# Patient Record
Sex: MALE | Race: WHITE | Employment: OTHER | URBAN - METROPOLITAN AREA
[De-identification: names, ages, dates, MRNs, and addresses within clinical notes are randomized per-mention and may not be internally consistent; named-entity substitution may affect disease eponyms.]

---

## 2017-09-14 ENCOUNTER — HOSPITAL ENCOUNTER (OUTPATIENT)
Dept: LAB | Age: 62
Discharge: HOME | End: 2017-09-14

## 2017-09-14 PROCEDURE — 88312 SPECIAL STAINS GROUP 1: CPT | Performed by: INTERNAL MEDICINE

## 2017-09-14 PROCEDURE — 88305 TISSUE EXAM BY PATHOLOGIST: CPT | Performed by: INTERNAL MEDICINE

## 2017-10-24 ENCOUNTER — HOSPITAL ENCOUNTER (OUTPATIENT)
Dept: MAMMOGRAPHY | Age: 62
Discharge: HOME | End: 2017-10-24
Attending: FAMILY MEDICINE
Payer: MEDICARE

## 2017-10-24 DIAGNOSIS — M81.0 OSTEOPOROSIS: ICD-10-CM

## 2017-10-24 PROCEDURE — 77080 DXA BONE DENSITY AXIAL: CPT

## 2018-02-16 ENCOUNTER — HOSPITAL ENCOUNTER (EMERGENCY)
Age: 63
Discharge: HOME | End: 2018-02-16
Attending: EMERGENCY MEDICINE
Payer: MEDICARE

## 2018-02-16 ENCOUNTER — APPOINTMENT (OUTPATIENT)
Dept: GENERAL RADIOLOGY | Age: 63
End: 2018-02-16
Attending: EMERGENCY MEDICINE
Payer: MEDICARE

## 2018-02-16 VITALS
HEART RATE: 95 BPM | SYSTOLIC BLOOD PRESSURE: 144 MMHG | TEMPERATURE: 98.1 F | DIASTOLIC BLOOD PRESSURE: 70 MMHG | OXYGEN SATURATION: 97 % | RESPIRATION RATE: 20 BRPM

## 2018-02-16 DIAGNOSIS — S62.102A LEFT WRIST FRACTURE, CLOSED, INITIAL ENCOUNTER: Primary | ICD-10-CM

## 2018-02-16 PROCEDURE — 99283 EMERGENCY DEPT VISIT LOW MDM: CPT | Performed by: EMERGENCY MEDICINE

## 2018-02-16 PROCEDURE — 74011250636 HC RX REV CODE- 250/636: Performed by: EMERGENCY MEDICINE

## 2018-02-16 PROCEDURE — 73110 X-RAY EXAM OF WRIST: CPT

## 2018-02-16 PROCEDURE — 75810000053 HC SPLINT APPLICATION: Performed by: EMERGENCY MEDICINE

## 2018-02-16 PROCEDURE — 90471 IMMUNIZATION ADMIN: CPT | Performed by: EMERGENCY MEDICINE

## 2018-02-16 PROCEDURE — 90715 TDAP VACCINE 7 YRS/> IM: CPT | Performed by: EMERGENCY MEDICINE

## 2018-02-16 RX ORDER — TRAMADOL HYDROCHLORIDE 50 MG/1
50 TABLET ORAL
Qty: 15 TAB | Refills: 0 | Status: SHIPPED | OUTPATIENT
Start: 2018-02-16 | End: 2018-02-21

## 2018-02-16 RX ADMIN — TETANUS TOXOID, REDUCED DIPHTHERIA TOXOID AND ACELLULAR PERTUSSIS VACCINE, ADSORBED 0.5 ML: 5; 2.5; 8; 8; 2.5 SUSPENSION INTRAMUSCULAR at 20:33

## 2018-02-16 NOTE — DISCHARGE INSTRUCTIONS
Broken Wrist: Care Instructions  Your Care Instructions    Your wrist can break, or fracture, during sports, a fall, or other accidents. The break may happen when your wrist is hit or is used to protect you in a fall. Fractures can range from a small, hairline crack, to a bone or bones broken into two or more pieces. Your treatment depends on how bad the break is. Your doctor may have put your wrist in a cast or splint. This will help keep your wrist stable until your follow-up appointment. It may take weeks or months for your wrist to heal. You can help it heal with care at home. You heal best when you take good care of yourself. Eat a variety of healthy foods, and don't smoke. Follow-up care is a key part of your treatment and safety. Be sure to make and go to all appointments, and call your doctor if you are having problems. It's also a good idea to know your test results and keep a list of the medicines you take. How can you care for yourself at home? · Put ice or a cold pack on your wrist for 10 to 20 minutes at a time. Try to do this every 1 to 2 hours for the next 3 days (when you are awake). Put a thin cloth between the ice and your cast or splint. Keep your cast or splint dry. · Follow the splint or cast care instructions your doctor gives you. If you have a splint, do not take it off unless your doctor tells you to. Be careful not to put the splint on too tight. · Be safe with medicines. Take pain medicines exactly as directed. ¨ If the doctor gave you a prescription medicine for pain, take it as prescribed. ¨ If you are not taking a prescription pain medicine, ask your doctor if you can take an over-the-counter medicine. · Prop up your wrist on pillows when you sit or lie down in the first few days after the injury. Keep your wrist higher than the level of your heart. This will help reduce swelling.   · Move your fingers often to reduce swelling and stiffness, but do not use that hand to grab or carry anything. · Follow instructions for exercises to keep your arm strong. When should you call for help? Call your doctor now or seek immediate medical care if:  ? · You have new or worse pain. ? · Your hand or fingers are cool or pale or change color. ? · Your cast or splint feels too tight. ? · You have tingling, weakness, or numbness in your hand or fingers. ? Watch closely for changes in your health, and be sure to contact your doctor if:  ? · You do not get better as expected. ? · You have problems with your cast or splint. Where can you learn more? Go to http://edy-polo.info/. Enter [id number] in the search box to learn more about \"Broken Wrist: Care Instructions. \"  Current as of: March 21, 2017  Content Version: 11.4  © 5823-0217 [employer]. Care instructions adapted under license by [employer] (which disclaims liability or warranty for this information). If you have questions about a medical condition or this instruction, always ask your healthcare professional. [address] any warranty or liability for your use of this information.

## 2018-02-16 NOTE — ED TRIAGE NOTES
Pt to er c/o left wrist pain. .. sts last drank gatorade approx 1600. .. Last ate at approx 1300 today. .. sts no alcohol today. .. sts he fell while doing yard work today. .. Tripped in yard. .. No dizziness. .. Denies hitting his head. ..  Denies loc

## 2018-02-16 NOTE — ED PROVIDER NOTES
Patient is a 60 y.o. male presenting with wrist pain. The history is provided by the patient and a friend. Wrist Pain    This is a new problem. The current episode started 1 to 2 hours ago. The problem occurs constantly. The problem has not changed since onset. The pain is present in the left wrist. The quality of the pain is described as aching and constant. The pain is at a severity of 5/10. Associated symptoms include limited range of motion and stiffness. Pertinent negatives include no numbness, no tingling, no itching, no back pain and no neck pain. The symptoms are aggravated by palpation and movement. He has tried rest for the symptoms. The treatment provided moderate relief. There has been a history of trauma (fell in the yard on outstretched hand).         Past Medical History:   Diagnosis Date    Anxiety     Arthritis     hands, knees    Singh esophagus     CAD (coronary artery disease)     \"minimal\" per 2013 heart cath    Cataracts, bilateral     Depression     Deviated nasal septum 5/1/2014    Diabetes (HCC)     Novolog insulin pump, bs below 90 begins having symptoms,-shaking, diaphoresis---120-240 -A1c 8.9 11-15    Gastroparesis     GERD (gastroesophageal reflux disease)     managed with medication    Osteoporosis     Prostate hypertrophy     managed with medication    Sleep apnea     wears cpap       Past Surgical History:   Procedure Laterality Date    HX CARPAL TUNNEL RELEASE Bilateral     lt x 2 , rt x 1    HX CATARACT REMOVAL Bilateral     HX ENDOSCOPY      x 3 with dialation    HX HIP FRACTURE TX Left     lt hip    HX ORTHOPAEDIC Left     lt foot, lt ankle         Family History:   Problem Relation Age of Onset    Heart Failure Mother     Cancer Father      prostate    Kidney Disease Father     Heart Disease Father        Social History     Social History    Marital status: SINGLE     Spouse name: N/A    Number of children: N/A    Years of education: N/A Occupational History    Not on file. Social History Main Topics    Smoking status: Never Smoker    Smokeless tobacco: Never Used    Alcohol use No    Drug use: No    Sexual activity: Not on file     Other Topics Concern    Not on file     Social History Narrative         ALLERGIES: Review of patient's allergies indicates no known allergies. Review of Systems   Constitutional: Negative for chills and fever. Musculoskeletal: Positive for arthralgias and stiffness. Negative for back pain and neck pain. Skin: Negative for itching. Neurological: Negative for tingling and numbness. All other systems reviewed and are negative. Vitals:    02/16/18 1737   Temp: 97.8 °F (36.6 °C)            Physical Exam   Constitutional: He is oriented to person, place, and time. He appears well-developed and well-nourished. No distress. HENT:   Head: Normocephalic and atraumatic. Right Ear: External ear normal.   Left Ear: External ear normal.   Eyes: Conjunctivae and EOM are normal. Pupils are equal, round, and reactive to light. Neck: Normal range of motion. Musculoskeletal:        Left wrist: He exhibits decreased range of motion, tenderness, swelling and deformity. He exhibits no effusion and no laceration. Neurological: He is alert and oriented to person, place, and time. He has normal strength. No cranial nerve deficit or sensory deficit. Skin: He is not diaphoretic. Psychiatric: He has a normal mood and affect. His speech is normal.   Nursing note and vitals reviewed.        MDM  Number of Diagnoses or Management Options  Left wrist fracture, closed, initial encounter: new and requires workup     Amount and/or Complexity of Data Reviewed  Tests in the radiology section of CPT®: ordered and reviewed  Review and summarize past medical records: yes    Risk of Complications, Morbidity, and/or Mortality  Presenting problems: moderate  Diagnostic procedures: moderate  Management options: moderate    Patient Progress  Patient progress: improved        ED Course       Procedures      Results Reviewed:    XR WRIST LT AP/LAT/OBL MIN 3V   Final Result   IMPRESSION: Distal radius and ulnar fractures. I discussed the results of all labs, procedures, radiographs, and treatments with the patient and available family. Treatment plan is agreed upon and the patient is ready for discharge. All voiced understanding of the discharge plan and medication instructions or changes as appropriate. Questions about treatment in the ED were answered. All were encouraged to return should symptoms worsen or new problems develop.

## 2018-02-17 NOTE — ED NOTES
Contacted pharmacy in regards to boostrix. They advise system wide shortage and that further doses are coming from [facility]. They advised ETA of appx 45 minutes. Patient advised of plan and is agreeable to it.

## 2018-02-17 NOTE — ED NOTES
I have reviewed discharge instructions with the patient. The patient verbalized understanding. Patient left ED via Discharge Method: ambulatory to Home via friend  Opportunity for questions and clarification provided. Patient given 1 scripts. Advised patient not to drive on any pain medication that makes him drowsy        To continue your aftercare when you leave the hospital, you may receive an automated call from our care team to check in on how you are doing. This is a free service and part of our promise to provide the best care and service to meet your aftercare needs.  If you have questions, or wish to unsubscribe from this service please call [phone]. Thank you for Choosing our [facility] Emergency Department.

## 2018-02-17 NOTE — ED NOTES
Patient's daughter and HPOA Kevin Brown) is leaving to take her mother home. She can be reached at [phone]. She will return tomorrow but can be reached at that number with any issues.

## 2018-02-20 ENCOUNTER — ANESTHESIA EVENT (OUTPATIENT)
Dept: SURGERY | Age: 63
End: 2018-02-20
Payer: MEDICARE

## 2018-02-21 ENCOUNTER — HOSPITAL ENCOUNTER (OUTPATIENT)
Age: 63
Setting detail: OUTPATIENT SURGERY
Discharge: HOME | End: 2018-02-21
Attending: ORTHOPAEDIC SURGERY | Admitting: ORTHOPAEDIC SURGERY
Payer: MEDICARE

## 2018-02-21 ENCOUNTER — ANESTHESIA (OUTPATIENT)
Dept: SURGERY | Age: 63
End: 2018-02-21
Payer: MEDICARE

## 2018-02-21 ENCOUNTER — APPOINTMENT (OUTPATIENT)
Dept: GENERAL RADIOLOGY | Age: 63
End: 2018-02-21
Attending: ORTHOPAEDIC SURGERY
Payer: MEDICARE

## 2018-02-21 VITALS
TEMPERATURE: 98.1 F | SYSTOLIC BLOOD PRESSURE: 118 MMHG | RESPIRATION RATE: 15 BRPM | WEIGHT: 210 LBS | HEART RATE: 79 BPM | BODY MASS INDEX: 25.06 KG/M2 | DIASTOLIC BLOOD PRESSURE: 67 MMHG | OXYGEN SATURATION: 95 %

## 2018-02-21 DIAGNOSIS — G89.18 POST-OP PAIN: Primary | ICD-10-CM

## 2018-02-21 LAB — GLUCOSE BLD STRIP.AUTO-MCNC: 195 MG/DL (ref 65–100)

## 2018-02-21 PROCEDURE — 74011250636 HC RX REV CODE- 250/636: Performed by: ORTHOPAEDIC SURGERY

## 2018-02-21 PROCEDURE — 77030002933 HC SUT MCRYL J&J -A: Performed by: ORTHOPAEDIC SURGERY

## 2018-02-21 PROCEDURE — A4565 SLINGS: HCPCS | Performed by: ORTHOPAEDIC SURGERY

## 2018-02-21 PROCEDURE — 77030000032 HC CUF TRNQT ZIMM -B: Performed by: ORTHOPAEDIC SURGERY

## 2018-02-21 PROCEDURE — 82962 GLUCOSE BLOOD TEST: CPT

## 2018-02-21 PROCEDURE — 77030008847 HC WRE K SYNT -A: Performed by: ORTHOPAEDIC SURGERY

## 2018-02-21 PROCEDURE — 76060000033 HC ANESTHESIA 1 TO 1.5 HR: Performed by: ORTHOPAEDIC SURGERY

## 2018-02-21 PROCEDURE — 76210000021 HC REC RM PH II 0.5 TO 1 HR: Performed by: ORTHOPAEDIC SURGERY

## 2018-02-21 PROCEDURE — 77030033681 HC SPLNT P-CUT SAF BSNM -A: Performed by: ORTHOPAEDIC SURGERY

## 2018-02-21 PROCEDURE — 76010000161 HC OR TIME 1 TO 1.5 HR INTENSV-TIER 1: Performed by: ORTHOPAEDIC SURGERY

## 2018-02-21 PROCEDURE — 74011250636 HC RX REV CODE- 250/636: Performed by: ANESTHESIOLOGY

## 2018-02-21 PROCEDURE — 74011250636 HC RX REV CODE- 250/636

## 2018-02-21 PROCEDURE — 77030003602 HC NDL NRV BLK BBMI -B: Performed by: ANESTHESIOLOGY

## 2018-02-21 PROCEDURE — 77030002916 HC SUT ETHLN J&J -A: Performed by: ORTHOPAEDIC SURGERY

## 2018-02-21 PROCEDURE — 77030011640 HC PAD GRND REM COVD -A: Performed by: ORTHOPAEDIC SURGERY

## 2018-02-21 PROCEDURE — 76942 ECHO GUIDE FOR BIOPSY: CPT | Performed by: ORTHOPAEDIC SURGERY

## 2018-02-21 PROCEDURE — C1713 ANCHOR/SCREW BN/BN,TIS/BN: HCPCS | Performed by: ORTHOPAEDIC SURGERY

## 2018-02-21 PROCEDURE — 74011250637 HC RX REV CODE- 250/637: Performed by: ANESTHESIOLOGY

## 2018-02-21 PROCEDURE — 76010010054 HC POST OP PAIN BLOCK: Performed by: ORTHOPAEDIC SURGERY

## 2018-02-21 PROCEDURE — 77030003862 HC BIT DRL SYNT -B: Performed by: ORTHOPAEDIC SURGERY

## 2018-02-21 PROCEDURE — 73100 X-RAY EXAM OF WRIST: CPT

## 2018-02-21 PROCEDURE — 77030002966 HC SUT PDS J&J -A: Performed by: ORTHOPAEDIC SURGERY

## 2018-02-21 PROCEDURE — 77030000031 HC BIT DRL QC SYNT -C: Performed by: ORTHOPAEDIC SURGERY

## 2018-02-21 PROCEDURE — 77030018836 HC SOL IRR NACL ICUM -A: Performed by: ORTHOPAEDIC SURGERY

## 2018-02-21 PROCEDURE — 76210000063 HC OR PH I REC FIRST 0.5 HR: Performed by: ORTHOPAEDIC SURGERY

## 2018-02-21 DEVICE — SCREW BNE L14MM DIA2.7MM CORT S STL ST LOK FULL THRD T8: Type: IMPLANTABLE DEVICE | Site: WRIST | Status: FUNCTIONAL

## 2018-02-21 DEVICE — 2.7MM CORTEX SCREW SLF-TPNG WITH T8 STARDRIVE RECESS 16MM: Type: IMPLANTABLE DEVICE | Site: WRIST | Status: FUNCTIONAL

## 2018-02-21 DEVICE — 2.4MM VA LOCKING SCREW STARDRIVE 14MM: Type: IMPLANTABLE DEVICE | Site: WRIST | Status: FUNCTIONAL

## 2018-02-21 DEVICE — 2.4MM VA LOCKING SCREW STARDRIVE 18MM: Type: IMPLANTABLE DEVICE | Site: WRIST | Status: FUNCTIONAL

## 2018-02-21 DEVICE — 2.4MM VA-LCP 2-CLMN VLR DSTL RADIUS PL 6H HD/3H SHAFT/LEFT
Type: IMPLANTABLE DEVICE | Site: WRIST | Status: FUNCTIONAL
Brand: VA-LCP

## 2018-02-21 RX ORDER — PROPOFOL 10 MG/ML
INJECTION, EMULSION INTRAVENOUS AS NEEDED
Status: DISCONTINUED | OUTPATIENT
Start: 2018-02-21 | End: 2018-02-21 | Stop reason: HOSPADM

## 2018-02-21 RX ORDER — CEFAZOLIN SODIUM/WATER 2 G/20 ML
2 SYRINGE (ML) INTRAVENOUS ONCE
Status: COMPLETED | OUTPATIENT
Start: 2018-02-21 | End: 2018-02-21

## 2018-02-21 RX ORDER — OXYCODONE HYDROCHLORIDE 5 MG/1
5 TABLET ORAL
Status: DISCONTINUED | OUTPATIENT
Start: 2018-02-21 | End: 2018-02-21 | Stop reason: HOSPADM

## 2018-02-21 RX ORDER — MIDAZOLAM HYDROCHLORIDE 1 MG/ML
5 INJECTION, SOLUTION INTRAMUSCULAR; INTRAVENOUS ONCE
Status: DISCONTINUED | OUTPATIENT
Start: 2018-02-21 | End: 2018-02-21 | Stop reason: HOSPADM

## 2018-02-21 RX ORDER — HYDROCODONE BITARTRATE AND ACETAMINOPHEN 5; 325 MG/1; MG/1
2 TABLET ORAL AS NEEDED
Status: DISCONTINUED | OUTPATIENT
Start: 2018-02-21 | End: 2018-02-21 | Stop reason: HOSPADM

## 2018-02-21 RX ORDER — SODIUM CHLORIDE, SODIUM LACTATE, POTASSIUM CHLORIDE, CALCIUM CHLORIDE 600; 310; 30; 20 MG/100ML; MG/100ML; MG/100ML; MG/100ML
75 INJECTION, SOLUTION INTRAVENOUS CONTINUOUS
Status: DISCONTINUED | OUTPATIENT
Start: 2018-02-21 | End: 2018-02-21 | Stop reason: HOSPADM

## 2018-02-21 RX ORDER — FAMOTIDINE 20 MG/1
20 TABLET, FILM COATED ORAL ONCE
Status: COMPLETED | OUTPATIENT
Start: 2018-02-21 | End: 2018-02-21

## 2018-02-21 RX ORDER — OXYCODONE HYDROCHLORIDE 5 MG/1
5-10 TABLET ORAL
Qty: 30 TAB | Refills: 0 | Status: SHIPPED | OUTPATIENT
Start: 2018-02-21 | End: 2018-03-14

## 2018-02-21 RX ORDER — ROPIVACAINE HYDROCHLORIDE 10 MG/ML
INJECTION EPIDURAL; INFILTRATION; PERINEURAL AS NEEDED
Status: DISCONTINUED | OUTPATIENT
Start: 2018-02-21 | End: 2018-02-21 | Stop reason: HOSPADM

## 2018-02-21 RX ORDER — LIDOCAINE HYDROCHLORIDE 10 MG/ML
0.1 INJECTION INFILTRATION; PERINEURAL AS NEEDED
Status: DISCONTINUED | OUTPATIENT
Start: 2018-02-21 | End: 2018-02-21 | Stop reason: HOSPADM

## 2018-02-21 RX ORDER — PROPOFOL 10 MG/ML
INJECTION, EMULSION INTRAVENOUS
Status: DISCONTINUED | OUTPATIENT
Start: 2018-02-21 | End: 2018-02-21 | Stop reason: HOSPADM

## 2018-02-21 RX ORDER — HYDROMORPHONE HYDROCHLORIDE 2 MG/ML
0.5 INJECTION, SOLUTION INTRAMUSCULAR; INTRAVENOUS; SUBCUTANEOUS
Status: DISCONTINUED | OUTPATIENT
Start: 2018-02-21 | End: 2018-02-21 | Stop reason: HOSPADM

## 2018-02-21 RX ORDER — MIDAZOLAM HYDROCHLORIDE 1 MG/ML
2 INJECTION, SOLUTION INTRAMUSCULAR; INTRAVENOUS
Status: DISCONTINUED | OUTPATIENT
Start: 2018-02-21 | End: 2018-02-21 | Stop reason: HOSPADM

## 2018-02-21 RX ORDER — FENTANYL CITRATE 50 UG/ML
100 INJECTION, SOLUTION INTRAMUSCULAR; INTRAVENOUS ONCE
Status: DISCONTINUED | OUTPATIENT
Start: 2018-02-21 | End: 2018-02-21 | Stop reason: HOSPADM

## 2018-02-21 RX ADMIN — PROPOFOL 160 MCG/KG/MIN: 10 INJECTION, EMULSION INTRAVENOUS at 09:33

## 2018-02-21 RX ADMIN — FAMOTIDINE 20 MG: 20 TABLET, FILM COATED ORAL at 08:04

## 2018-02-21 RX ADMIN — Medication 2 G: at 09:35

## 2018-02-21 RX ADMIN — PROPOFOL 30 MG: 10 INJECTION, EMULSION INTRAVENOUS at 09:33

## 2018-02-21 RX ADMIN — SODIUM CHLORIDE, SODIUM LACTATE, POTASSIUM CHLORIDE, AND CALCIUM CHLORIDE 75 ML/HR: 600; 310; 30; 20 INJECTION, SOLUTION INTRAVENOUS at 08:04

## 2018-02-21 RX ADMIN — ROPIVACAINE HYDROCHLORIDE 15 ML: 10 INJECTION EPIDURAL; INFILTRATION; PERINEURAL at 08:56

## 2018-02-21 RX ADMIN — MIDAZOLAM HYDROCHLORIDE 3 MG: 1 INJECTION, SOLUTION INTRAMUSCULAR; INTRAVENOUS at 08:51

## 2018-02-21 NOTE — PERIOP NOTES
Discharge instructions given to friend. Patient dressed with sling over clothing. Verbalized understanding and opportunity for questions was given. Dr Carvalho Erps okay to discharge at this time. Pt and belongings taken via wheelchair to car.

## 2018-02-21 NOTE — ANESTHESIA PROCEDURE NOTES
Peripheral Block    Start time: 2/21/2018 8:52 AM  End time: 2/21/2018 8:56 AM  Performed by: Robert Coronado  Authorized by: Noel SANTACRUZ       Pre-procedure:    Indications: at surgeon's request, post-op pain management and procedure for pain    Preanesthetic Checklist: patient identified, risks and benefits discussed, site marked, timeout performed, anesthesia consent given and patient being monitored    Timeout Time: 08:51          Block Type:   Block Type:  Supraclavicular  Laterality:  Left  Monitoring:  Standard ASA monitoring, responsive to questions, oxygen, continuous pulse ox, heart rate and frequent vital sign checks  Injection Technique:  Single shot  Procedures: ultrasound guided and nerve stimulator    Patient Position: supine  Prep: chlorhexidine    Location:  Supraclavicular  Needle Type:  Stimuplex  Needle Gauge:  22 G  Needle Localization:  Nerve stimulator and ultrasound guidance  Motor Response: minimal motor response >0.4 mA    Medication Injected:  1.0%  ropivacaine  Adds:  Epi 1:200K  Volume (mL):  15  Add'l Medication Injected:  1.5%  mepivacaine  Adds:  Epi 1:200K  Volume (mL):  15    Assessment:  Number of attempts:  1  Injection Assessment:  Incremental injection every 5 mL, no paresthesia, ultrasound image on chart, local visualized surrounding nerve on ultrasound, negative aspiration for blood and no intravascular symptoms  Patient tolerance:  Patient tolerated the procedure well with no immediate complications

## 2018-02-21 NOTE — ANESTHESIA POSTPROCEDURE EVALUATION
Post-Anesthesia Evaluation and Assessment    Patient: Bishop Francisco MRN: [id number]  SSN: [id number]    YOB: 1955  Age: 60 y.o. Sex: male       Cardiovascular Function/Vital Signs  Visit Vitals    /63    Pulse 98    Temp 36.7 °C (98.1 °F)    Resp 16    Wt 95.3 kg (210 lb)    SpO2 98%    BMI 25.06 kg/m2       Patient is status post general anesthesia for Procedure(s):  LEFT DISTAL RADIUS  OPEN REDUCTION INTERNAL FIXATION  . Nausea/Vomiting: None    Postoperative hydration reviewed and adequate. Pain:  Pain Scale 1: Numeric (0 - 10) (02/21/18 1044)  Pain Intensity 1: 0 (02/21/18 1044)   Managed    Neurological Status:   Neuro (WDL): Exceptions to WDL (02/21/18 1044)  Neuro  LUE Motor Response: Pharmacologically paralyzed;Numbness (02/21/18 1044)   At baseline    Mental Status and Level of Consciousness: Arousable    Pulmonary Status:   O2 Device: Room air (02/21/18 1046)   Adequate oxygenation and airway patent    Complications related to anesthesia: None    Post-anesthesia assessment completed.  No concerns    Signed By: Winsome Morfin MD     February 21, 2018

## 2018-02-21 NOTE — OP NOTES
OPERATIVE NOTE    Jesus Mackay is a 60 y.o. male with a fx of the distal radius that is not in satisfactory position for closed treatment. 2/21/2018    PREOP DIAGNOSIS: NON-ARTICULAR FRACTURE OF THE  LEFT DISTAL RADIUS    POSTOP DIAGNOSIS:  NON-ARTICULAR FRACTURE OF THE  LEFT DISTAL RADIUS    PROCEDURE:  OPEN REDUCTION AND INTERNAL FIXATION OF  NON-ARTICULAR 2 PART FRACTURE OF THE LEFT DISTAL RADIUS    SURGEON:  Cheko Corey MD    ANESTHESIA:  Regional    INDICATIONS: The planned procedure and alternatives for treatment have been discussed previously. Informed consent has been obtained. The operative site has been marked and perioperative antibiotics ordered if felt to be indicated. PROCEDURAL NOTE:   With the patient in the supine position,  the left upper extremity was prepped and draped as a sterile field. The patient had been given IV prophylactic Ancef. A time out was held with the operative team and the patient, procedure, surgical site and surgeon identified. The extremity was exsanguinated with a sterile Esmarch bandage and a tourniquet around the upper arm inflated to 250 mm HG. A longitudinal skin incision was made over the flexor carpi radialis tendon. Small bleeders were electro coagulated. The FCR was released from it sheath and retracted out of harms way. The pronator quadratus and the distal part of the FPL muscle  were detached radially and retracted ulnarward protecting the median nerve and carpal tunnel. The volar aspect of the distal radius was exposed subperiosteally and the fracture fragments reduced. A Synthes Variable Angle standard volar plate was placed over the fracture and positioned using the image intensifier to aid in the placement. It was temporarily held in place while permanent fixation was obtained using locking and non locked screws. At various times the position and length of the screws were checked with fluoroscopy.   After all the necessary screws were in place the position was again checked with fluoro and the fixation seemed to be stable and well aligned with no overly long fixation extending into the soft tissues. The wound was thoroughly irrigated and the pronator reattached with 3-0 PDS sutures. The FCR sheath was also repaired with 3-0 PDS. The skin was closed with 4-0 nylon. Sterile dressings of Xeroform, 4x4 gauzes and webril was applied followed by a volar fiberglas splint held in place with an ACE wrap. The tourniquet was released with return of flow to the fingers. The patient was sent to the recovery room in good condition. TOURNIQUET TIME:   Total Tourniquet Time Documented:  Upper Arm (Left) - 50 minutes  Total: Upper Arm (Left) - 50 minutes      IMPLANTS:   Implant Name Type Inv.  Item Serial No.  Lot No. LRB No. Used Action   PLATE RAD DSTL 2-C 6H HD/3H LT -- VOLAR VA-LCP 2.4MM - [id number]  PLATE RAD DSTL 2-C 6H HD/3H LT -- VOLAR VA-LCP 2.4MM  [employer] [place] [id number] Left 1 Implanted   SCR BNE CRTX ST T8 2.7X16MM SS --  - [id number]  SCR BNE CRTX ST T8 2.7X16MM SS --   [employer] [place] [id number] Left 1 Implanted   SCR VA LCK ST STRDRV 2.4X18MM --  - [id number]  SCR VA LCK ST STRDRV 2.4X18MM --   [employer] [place] [id number] Left 3 Implanted   SCR VA LCK ST STRDRV 2.4X14MM --  - [id number]  SCR VA LCK ST STRDRV 2.4X14MM --   [employer] [place] [id number] Left 1 Implanted   SCR BNE LCK ST T8 2.7X14MM SS --  - [id number]   SCR BNE LCK ST T8 2.7X14MM SS --    [employer] [place] [id number] Left 2 Implanted       SIGNED: Marisabel Sousa MD

## 2018-02-21 NOTE — PERIOP NOTES
SQBS 253 upon arrival to pacu. Dr Les Gonzalez aware.  Patient to dose himself with Insulin pump according to his usual protocol

## 2018-02-21 NOTE — PERIOP NOTES
Dr Pauline Harris informed of glucose of 195 (188 per his monitor) and that pt is on insulin pump. Pt now states he is trending up at 214. Dr Pauline Harris informed-states for pt to adjust his normal treatment dose and give half. Pt did this.

## 2018-02-21 NOTE — DISCHARGE INSTRUCTIONS
POST OP INSTRUCTIONS      1. Patient has appointment for 3/1/18 at 1:50 PM at the [facility]. 2.  For problems call Dr Izabella Meeks, [address] [facility],  [phone]          Appointments only,  [phone]    3. Ice and elevate the surgical site. 4.  Keep splints and dressing dry and intact. 5.  If able to tolerate, take   Acetaminophen 325 mg  2 every 4 hrs   And                                               Ibuprofen 200 mg   3 every 6 hrs   OR   Aleve 220 mg 2 every 12 hrs to help with pain                                                   ( Do not take Ibuprofen or Aleve if taking any other anti inflamatory drug, NSAID, or anti arthritis drug or if taking blood thinners.)                                                 Vitamin C   500 mg  Once a day for 2 months. TYPICAL SIDE EFFECTS OF PAIN MEDICATION:  *    Constipation: Drink lots of fluids, try prune juice. OTC stool softener if needed. *    Nausea: Take pain medication with food. ACTIVITY  · As tolerated and as directed by your doctor. · Bathe or shower as directed by your doctor. DIET  · Day of surgery: Clear liquids until no nausea or vomiting; small portion, light diet [place] foods (ex: baked chicken, plain rice, grits, scrambled eggs, toast). Nothing greasy, fried or spicy today. · Advance to regular diet on second day, unless your doctor orders otherwise. · If nausea and vomiting continues, call your doctor. PAIN  · Take pain medication as directed by your doctor. · DO NOT take aspirin or blood thinners unless directed by your doctor.        CALL YOUR DOCTOR IF    s Call your doctor if pain is NOT relieved by medication.   s Excessive bleeding that does not stop after holding pressure over the area  · Temperature of 101 degrees F or above  · Excessive redness, swelling or bruising, and/ or green or yellow, smelly discharge from incision    AFTER ANESTHESIA   · For the first 24 hours: DO NOT Drive, Drink alcoholic beverages, or Make important decisions. · Be aware of dizziness following anesthesia and while taking pain medication. DISCHARGE SUMMARY from Nurse    PATIENT INSTRUCTIONS:    After general anesthesia or intravenous sedation, for 24 hours or while taking prescription Narcotics:  · Limit your activities  · Do not drive and operate hazardous machinery  · Do not make important personal or business decisions  · Do  not drink alcoholic beverages  · If you have not urinated within 8 hours after discharge, please contact your surgeon on call. *  Please give a list of your current medications to your Primary Care Provider. *  Please update this list whenever your medications are discontinued, doses are      changed, or new medications (including over-the-counter products) are added. *  Please carry medication information at all times in case of emergency situations. Preventing Infection at Home  We care about preventing infection and avoiding the spread of germs - not only when you are in the hospital but also when you return home. When you return home from the hospital, its important to take the following steps to help prevent infection and avoid spreading germs that could infect you and others. Ask everyone in your home to follow these guidelines, too. Clean Your Hands  · Clean your hands whenever your hands are visibly dirty, before you eat, before or after touching your mouth, nose or eyes, and before preparing food. Clean them after contact with body fluids, using the restroom, touching animals or changing diapers. · When washing hands, wet them with warm water and work up a lather. Rub hands for at least 15 seconds, then rinse them and pat them dry with a clean towel or paper towel. · When using hand sanitizers, it should take about 15 seconds to rub your hands dry.  If not, you probably didnt apply enough . Cover Your Sneeze or Cough  Germs are released into the air whenever you sneeze or cough. To prevent the spread of infection:  · Turn away from other people before coughing or sneezing. · Cover your mouth or nose with a tissue when you cough or sneeze. Put the tissue in the trash. · If you dont have a tissue, cough or sneeze into your upper sleeve, not your hands. · Always clean your hands after coughing or sneezing. Care for Wounds  Your skin is your bodys first line of defense against germs, but an open wound leaves an easy way for germs to enter your body. To prevent infection:  · Clean your hands before and after changing wound dressings, and wear gloves to change dressings if recommended by your doctor. · Take special care with IV lines or other devices inserted into the body. If you must touch them, clean your hands first.  · Follow any specific instructions from your doctor to care for your wounds. Contact your doctor if you experience any signs of infection, such as fever or increased redness at the surgical or wound site. Keep a Clean Home  · Clean or wipe commonly touched hard surfaces like door handles, sinks, tabletops, phones and TV remotes. · Use products labeled disinfectant to kill harmful bacteria and viruses. · Use a clean cloth or paper towel to clean and dry surfaces. Wiping surfaces with a dirty dishcloth, sponge or towel will only spread germs. · Never share toothbrushes, hudson, drinking glasses, utensils, razor blades, face cloths or bath towels to avoid spreading germs. · Be sure that the linens that you sleep on are clean. · Keep pets away from wounds and wash your hands after touching pets, their toys or bedding. We care about you and your health. Remember, preventing infections is a team effort between you, your family, friends and health care providers.        These are general instructions for a healthy lifestyle:    No smoking/ No tobacco products/ Avoid exposure to second hand smoke    Surgeon General's Warning:  Quitting smoking now greatly reduces serious risk to your health. Obesity, smoking, and sedentary lifestyle greatly increases your risk for illness    A healthy diet, regular physical exercise & weight monitoring are important for maintaining a healthy lifestyle    You may be retaining fluid if you have a history of heart failure or if you experience any of the following symptoms:  Weight gain of 3 pounds or more overnight or 5 pounds in a week, increased swelling in our hands or feet or shortness of breath while lying flat in bed. Please call your doctor as soon as you notice any of these symptoms; do not wait until your next office visit. Recognize signs and symptoms of STROKE:    F-face looks uneven    A-arms unable to move or move unevenly    S-speech slurred or non-existent    T-time-call 911 as soon as signs and symptoms begin-DO NOT go       Back to bed or wait to see if you get better-TIME IS BRAIN.

## 2018-02-21 NOTE — ANESTHESIA PREPROCEDURE EVALUATION
Anesthetic History   No history of anesthetic complications            Review of Systems / Medical History  Patient summary reviewed and pertinent labs reviewed    Pulmonary        Sleep apnea: CPAP           Neuro/Psych   Within defined limits           Cardiovascular                  Exercise tolerance: >4 METS     GI/Hepatic/Renal     GERD: well controlled           Endo/Other    Diabetes: well controlled, type 2, using insulin         Other Findings            Physical Exam    Airway  Mallampati: II  TM Distance: 4 - 6 cm  Neck ROM: normal range of motion   Mouth opening: Normal     Cardiovascular  Regular rate and rhythm,  S1 and S2 normal,  no murmur, click, rub, or gallop             Dental         Pulmonary  Breath sounds clear to auscultation               Abdominal  GI exam deferred       Other Findings            Anesthetic Plan    ASA: 3  Anesthesia type: total IV anesthesia and general - backup      Post-op pain plan if not by surgeon: peripheral nerve block single    Induction: Intravenous  Anesthetic plan and risks discussed with: Patient

## 2018-02-21 NOTE — IP AVS SNAPSHOT
Romana Halo 
 
 
 [address] [facility] [address] [facility] 
[phone] Patient: Mary Alexander MRN: [id number] WNN:[birth year] About your hospitalization You were admitted on:  February 21, 2018 You last received care in the:  [facility] OP PACU You were discharged on:  February 21, 2018 Why you were hospitalized Your primary diagnosis was:  Not on File Follow-up Information Follow up With Details Comments Contact Info Kecia Mccoy MD   [address] [address] 
[phone] Dennie Billow, MD   [address] [facility] [address] 
[phone] Discharge Orders None A check douglas indicates which time of day the medication should be taken. My Medications START taking these medications Instructions Each Dose to Equal  
 Morning Noon Evening Bedtime  
 oxyCODONE IR 5 mg immediate release tablet Commonly known as:  Viniciodonald Conte Your last dose was: Your next dose is: Take 1-2 Tabs by mouth every four (4) hours as needed for Pain. Max Daily Amount: 60 mg.  
 5-10 mg CHANGE how you take these medications Instructions Each Dose to Equal  
 Morning Noon Evening Bedtime  
 finasteride 5 mg tablet Commonly known as:  PROSCAR What changed:  when to take this Your last dose was: Your next dose is: Take 1 Tab by mouth daily. 5 mg CONTINUE taking these medications Instructions Each Dose to Equal  
 Morning Noon Evening Bedtime  
 aspirin 81 mg chewable tablet Your last dose was: Your next dose is: Take 81 mg by mouth daily. Take day of surgery per anesthesia protocol. 81 mg  
    
   
   
   
  
 BACTRIM -800 mg per tablet Generic drug:  trimethoprim-sulfamethoxazole Your last dose was: Your next dose is: Take 1 Tab by mouth two (2) times a day. Pt reports he only takes QHS  
 1 Tab  
    
   
   
   
  
 calcium 600 mg Cap Your last dose was: Your next dose is: Take 1 Tab by mouth three (3) times daily. Calcium carbonate and vitamin D 600mg-125 units Stop seven days prior to surgery per anesthesia protocol. 1 Tab  
    
   
   
   
  
 latanoprost 0.005 % ophthalmic solution Commonly known as:  Nilo Padgett Your last dose was: Your next dose is:    
   
   
 Administer 1 Drop to right eye nightly. 1 Drop LIPITOR 20 mg tablet Generic drug:  atorvastatin Your last dose was: Your next dose is: Take 20 mg by mouth daily. Take day of surgery per anesthesia protocol. 20 mg  
    
   
   
   
  
 melatonin 3 mg tablet Your last dose was: Your next dose is: Take 1 mg by mouth nightly. Stop seven days prior to surgery per anesthesia protocol. 1 mg NexIUM 20 mg capsule Generic drug:  esomeprazole Your last dose was: Your next dose is: Take 40 mg by mouth daily. Take day of surgery per anesthesia protocol. 40 mg NovoLOG U-100 Insulin aspart 100 unit/mL injection Generic drug:  insulin aspart U-100 Your last dose was: Your next dose is:    
   
   
 by SubCUTAneous route. Insulin pump-basal rate-midnight-0300=0.37u, 9766-5109=1.55u, 6163-0176=0.55u,1610-7262=0.425u,8239-2332=0.4u,6797-5924=0.35u,-2166-4271=0.4u Continue basal rate  Indications: type 1 diabetes mellitus PROzac 20 mg capsule Generic drug:  FLUoxetine Your last dose was: Your next dose is: Take 40 mg by mouth daily. Take day of surgery per anesthesia protocol. 40 mg  
    
   
   
   
  
 SINGULAIR 10 mg tablet Generic drug:  montelukast  
   
 Your last dose was: Your next dose is: Take 10 mg by mouth daily. Indications: morning 10 mg  
    
   
   
   
  
 tamsulosin 0.4 mg capsule Commonly known as:  FLOMAX Your last dose was: Your next dose is: Take 0.4 mg by mouth daily. Take day of surgery per anesthesia protocol. 0.4 mg  
    
   
   
   
  
 TRENtal 400 mg CR tablet Generic drug:  pentoxifylline CR Your last dose was: Your next dose is: Take 400 mg by mouth three (3) times daily (with meals). Per Jane Arts - pt can take DOS  
 400 mg  
    
   
   
   
  
  
STOP taking these medications   
 traMADol 50 mg tablet Commonly known as:  ULTRAM  
   
  
  
  
Where to Get Your Medications Information on where to get these meds will be given to you by the nurse or doctor. ! Ask your nurse or doctor about these medications  
  oxyCODONE IR 5 mg immediate release tablet Discharge Instructions POST OP INSTRUCTIONS 1. Patient has appointment for 3/1/18 at 1:50 PM at the [facility]. 2.  For problems call Dr Bishop Torres, [facility],  [phone] Appointments only,  [phone] 
 
3. Ice and elevate the surgical site. 4.  Keep splints and dressing dry and intact. 5.  If able to tolerate, take   Acetaminophen 325 mg  2 every 4 hrs   And Ibuprofen 200 mg   3 every 6 hrs   OR   Aleve 220 mg 2 every 12 hrs to help with pain ( Do not take Ibuprofen or Aleve if taking any other anti inflamatory drug, NSAID, or anti arthritis drug or if taking blood thinners.) Vitamin C   500 mg  Once a day for 2 months. TYPICAL SIDE EFFECTS OF PAIN MEDICATION: 
*    Constipation: Drink lots of fluids, try prune juice.  OTC stool softener if needed. *    Nausea: Take pain medication with food. ACTIVITY · As tolerated and as directed by your doctor. · Bathe or shower as directed by your doctor. DIET 
· Day of surgery: Clear liquids until no nausea or vomiting; small portion, light diet [place] foods (ex: baked chicken, plain rice, grits, scrambled eggs, toast). Nothing greasy, fried or spicy today. · Advance to regular diet on second day, unless your doctor orders otherwise. · If nausea and vomiting continues, call your doctor. PAIN 
· Take pain medication as directed by your doctor. · DO NOT take aspirin or blood thinners unless directed by your doctor. CALL YOUR DOCTOR IF   
s Call your doctor if pain is NOT relieved by medication.  
s Excessive bleeding that does not stop after holding pressure over the area · Temperature of 101 degrees F or above · Excessive redness, swelling or bruising, and/ or green or yellow, smelly discharge from incision AFTER ANESTHESIA · For the first 24 hours: DO NOT Drive, Drink alcoholic beverages, or Make important decisions. · Be aware of dizziness following anesthesia and while taking pain medication. DISCHARGE SUMMARY from Nurse PATIENT INSTRUCTIONS: 
 
After general anesthesia or intravenous sedation, for 24 hours or while taking prescription Narcotics: · Limit your activities · Do not drive and operate hazardous machinery · Do not make important personal or business decisions · Do  not drink alcoholic beverages · If you have not urinated within 8 hours after discharge, please contact your surgeon on call. *  Please give a list of your current medications to your Primary Care Provider. *  Please update this list whenever your medications are discontinued, doses are 
    changed, or new medications (including over-the-counter products) are added. *  Please carry medication information at all times in case of emergency situations. Preventing Infection at Home We care about preventing infection and avoiding the spread of germs  not only when you are in the hospital but also when you return home. When you return home from the hospital, its important to take the following steps to help prevent infection and avoid spreading germs that could infect you and others. Ask everyone in your home to follow these guidelines, too. Clean Your Hands · Clean your hands whenever your hands are visibly dirty, before you eat, before or after touching your mouth, nose or eyes, and before preparing food. Clean them after contact with body fluids, using the restroom, touching animals or changing diapers. · When washing hands, wet them with warm water and work up a lather. Rub hands for at least 15 seconds, then rinse them and pat them dry with a clean towel or paper towel. · When using hand sanitizers, it should take about 15 seconds to rub your hands dry. If not, you probably didnt apply enough . Cover Your Sneeze or Cough Germs are released into the air whenever you sneeze or cough. To prevent the spread of infection: · Turn away from other people before coughing or sneezing. · Cover your mouth or nose with a tissue when you cough or sneeze. Put the tissue in the trash. · If you dont have a tissue, cough or sneeze into your upper sleeve, not your hands. · Always clean your hands after coughing or sneezing. Care for Wounds Your skin is your bodys first line of defense against germs, but an open wound leaves an easy way for germs to enter your body. To prevent infection: · Clean your hands before and after changing wound dressings, and wear gloves to change dressings if recommended by your doctor. · Take special care with IV lines or other devices inserted into the body.  If you must touch them, clean your hands first. 
· Follow any specific instructions from your doctor to care for your wounds. Contact your doctor if you experience any signs of infection, such as fever or increased redness at the surgical or wound site. Keep a [place] [address] · Clean or wipe commonly touched hard surfaces like door handles, sinks, tabletops, phones and TV remotes. · Use products labeled disinfectant to kill harmful bacteria and viruses. · Use a clean cloth or paper towel to clean and dry surfaces. Wiping surfaces with a dirty dishcloth, sponge or towel will only spread germs. · Never share toothbrushes, hudson, drinking glasses, utensils, razor blades, face cloths or bath towels to avoid spreading germs. · Be sure that the linens that you sleep on are clean. · Keep pets away from wounds and wash your hands after touching pets, their toys or bedding. We care about you and your health. Remember, preventing infections is a team effort between you, your family, friends and health care providers. These are general instructions for a healthy lifestyle: No smoking/ No tobacco products/ Avoid exposure to second hand smoke Surgeon General's Warning:  Quitting smoking now greatly reduces serious risk to your health. Obesity, smoking, and sedentary lifestyle greatly increases your risk for illness A healthy diet, regular physical exercise & weight monitoring are important for maintaining a healthy lifestyle You may be retaining fluid if you have a history of heart failure or if you experience any of the following symptoms:  Weight gain of 3 pounds or more overnight or 5 pounds in a week, increased swelling in our hands or feet or shortness of breath while lying flat in bed. Please call your doctor as soon as you notice any of these symptoms; do not wait until your next office visit. Recognize signs and symptoms of STROKE: 
 
F-face looks uneven A-arms unable to move or move unevenly S-speech slurred or non-existent T-time-call 911 as soon as signs and symptoms begin-DO NOT go  
 Back to bed or wait to see if you get better-TIME IS BRAIN. Introducing [facility]! Dc Fletcher introduces [employer] patient portal. Now you can access parts of your medical record, email your doctor's office, and request medication refills online. 1. In your internet browser, go to [email or web address] 2. Click on the First Time User? Click Here link in the Sign In box. You will see the New Member Sign Up page. 3. Enter your [employer] Access Code exactly as it appears below. You will not need to use this code after youve completed the sign-up process. If you do not sign up before the expiration date, you must request a new code. · [employer] Access Code: [email or web address] Expires: 5/17/2018  5:35 PM 
 
4. Enter the last four digits of your Social Security Number (xxxx) and Date of Birth (mm/dd/yyyy) as indicated and click Submit. You will be taken to the next sign-up page. 5. Create a [employer] ID. This will be your [employer] login ID and cannot be changed, so think of one that is secure and easy to remember. 6. Create a [employer] password. You can change your password at any time. 7. Enter your Password Reset Question and Answer. This can be used at a later time if you forget your password. 8. Enter your e-mail address. You will receive e-mail notification when new information is available in [address] 9. Click Sign Up. You can now view and download portions of your medical record. 10. Click the Download Summary menu link to download a portable copy of your medical information. If you have questions, please visit the Frequently Asked Questions section of the [employer] website. Remember, [employer] is NOT to be used for urgent needs. For medical emergencies, dial 911. Now available from your iPhone and Android! Unresulted Labs-Please follow up with your PCP about these lab tests Order Current Status NC XR TECHNOLOGIST SERVICE In process Providers Seen During Your Hospitalization Provider Specialty Primary office phone Alix Cuevas MD Orthopedic Surgery [phone] Your Primary Care Physician (PCP) Primary Care Physician Office Phone Office Fax [address] [facility], [address] [place], [place] [address]set Topmost You are allergic to the following No active allergies Recent Documentation Weight BMI Smoking Status 95.3 kg 25.06 kg/m2 Never Smoker Emergency Contacts Name Discharge Info Relation Home Work Mobile Bernardo Magana DISCHARGE CAREGIVER [3] Friend [5] [phone] Reza Botello  Brother [24] [phone] Patient Belongings The following personal items are in your possession at time of discharge: 
  Dental Appliances: None         Home Medications: None   Jewelry: None  Clothing: Footwear, Pants, Shirt    Other Valuables: None Please provide this summary of care documentation to your next provider. Signatures-by signing, you are acknowledging that this After Visit Summary has been reviewed with you and you have received a copy. Patient Signature:  ____________________________________________________________ Date:  ____________________________________________________________  
  
Trinity Sleeper Provider Signature:  ____________________________________________________________ Date:  ____________________________________________________________

## 2018-03-14 PROBLEM — Z80.42 FAMILY HISTORY OF PROSTATE CANCER IN FATHER: Status: ACTIVE | Noted: 2018-03-14

## 2018-05-18 ENCOUNTER — HOSPITAL ENCOUNTER (OUTPATIENT)
Dept: INFUSION THERAPY | Age: 63
Discharge: HOME | End: 2018-05-18
Payer: MEDICARE

## 2018-05-18 VITALS
RESPIRATION RATE: 18 BRPM | HEART RATE: 76 BPM | TEMPERATURE: 98.4 F | DIASTOLIC BLOOD PRESSURE: 58 MMHG | SYSTOLIC BLOOD PRESSURE: 111 MMHG | OXYGEN SATURATION: 99 %

## 2018-05-18 LAB
CORTIS 1H P CHAL SERPL-MCNC: 24.7 UG/DL
CORTIS 30M P CHAL SERPL-MCNC: 21.2 UG/DL
CORTIS BS SERPL-MCNC: 11.9 UG/DL

## 2018-05-18 PROCEDURE — 82533 TOTAL CORTISOL: CPT

## 2018-05-18 PROCEDURE — 74011250636 HC RX REV CODE- 250/636

## 2018-05-18 PROCEDURE — 74011000250 HC RX REV CODE- 250

## 2018-05-18 PROCEDURE — 96374 THER/PROPH/DIAG INJ IV PUSH: CPT

## 2018-05-18 RX ORDER — SODIUM CHLORIDE 0.9 % (FLUSH) 0.9 %
5 SYRINGE (ML) INJECTION AS NEEDED
Status: DISCONTINUED | OUTPATIENT
Start: 2018-05-18 | End: 2018-05-22 | Stop reason: HOSPADM

## 2018-05-18 RX ADMIN — COSYNTROPIN 0.25 MG: 0.25 INJECTION, POWDER, LYOPHILIZED, FOR SOLUTION INTRAMUSCULAR; INTRAVENOUS at 07:58

## 2018-05-18 RX ADMIN — Medication 5 ML: at 08:00

## 2018-05-18 RX ADMIN — Medication 5 ML: at 09:00

## 2018-05-18 RX ADMIN — Medication 5 ML: at 08:30

## 2018-05-18 NOTE — PROGRESS NOTES
Arrived to infusion  Cortrosyn stimulation test done. Tolerated well  Specimens drawn for baseline,30 min,60 min drawn, sent to lab.

## 2020-03-13 NOTE — PROGRESS NOTES
Unable to contact pt at this time to notify him of appointment time and visitor restrictions for Monday.

## 2020-03-15 ENCOUNTER — ANESTHESIA EVENT (OUTPATIENT)
Dept: ENDOSCOPY | Age: 65
End: 2020-03-15
Payer: MEDICARE

## 2020-03-15 RX ORDER — SODIUM CHLORIDE, SODIUM LACTATE, POTASSIUM CHLORIDE, CALCIUM CHLORIDE 600; 310; 30; 20 MG/100ML; MG/100ML; MG/100ML; MG/100ML
100 INJECTION, SOLUTION INTRAVENOUS CONTINUOUS
Status: CANCELLED | OUTPATIENT
Start: 2020-03-15

## 2020-03-16 ENCOUNTER — HOSPITAL ENCOUNTER (OUTPATIENT)
Age: 65
Setting detail: OUTPATIENT SURGERY
Discharge: HOME | End: 2020-03-16
Attending: INTERNAL MEDICINE | Admitting: INTERNAL MEDICINE
Payer: MEDICARE

## 2020-03-16 ENCOUNTER — ANESTHESIA (OUTPATIENT)
Dept: ENDOSCOPY | Age: 65
End: 2020-03-16
Payer: MEDICARE

## 2020-03-16 VITALS
TEMPERATURE: 98 F | RESPIRATION RATE: 14 BRPM | SYSTOLIC BLOOD PRESSURE: 129 MMHG | OXYGEN SATURATION: 99 % | DIASTOLIC BLOOD PRESSURE: 67 MMHG | HEART RATE: 81 BPM

## 2020-03-16 LAB
GLUCOSE BLD STRIP.AUTO-MCNC: 203 MG/DL (ref 65–100)
GLUCOSE BLD STRIP.AUTO-MCNC: 222 MG/DL (ref 65–100)

## 2020-03-16 PROCEDURE — 74011250636 HC RX REV CODE- 250/636: Performed by: NURSE ANESTHETIST, CERTIFIED REGISTERED

## 2020-03-16 PROCEDURE — 74011250636 HC RX REV CODE- 250/636: Performed by: ANESTHESIOLOGY

## 2020-03-16 PROCEDURE — 77030021593 HC FCPS BIOP ENDOSC BSC -A: Performed by: INTERNAL MEDICINE

## 2020-03-16 PROCEDURE — 76060000032 HC ANESTHESIA 0.5 TO 1 HR: Performed by: INTERNAL MEDICINE

## 2020-03-16 PROCEDURE — 88305 TISSUE EXAM BY PATHOLOGIST: CPT

## 2020-03-16 PROCEDURE — 76040000026: Performed by: INTERNAL MEDICINE

## 2020-03-16 PROCEDURE — 88312 SPECIAL STAINS GROUP 1: CPT

## 2020-03-16 PROCEDURE — C1726 CATH, BAL DIL, NON-VASCULAR: HCPCS | Performed by: INTERNAL MEDICINE

## 2020-03-16 PROCEDURE — 82962 GLUCOSE BLOOD TEST: CPT

## 2020-03-16 RX ORDER — SODIUM CHLORIDE, SODIUM LACTATE, POTASSIUM CHLORIDE, CALCIUM CHLORIDE 600; 310; 30; 20 MG/100ML; MG/100ML; MG/100ML; MG/100ML
100 INJECTION, SOLUTION INTRAVENOUS CONTINUOUS
Status: DISCONTINUED | OUTPATIENT
Start: 2020-03-16 | End: 2020-03-16 | Stop reason: HOSPADM

## 2020-03-16 RX ORDER — PROPOFOL 10 MG/ML
INJECTION, EMULSION INTRAVENOUS
Status: DISCONTINUED | OUTPATIENT
Start: 2020-03-16 | End: 2020-03-16 | Stop reason: HOSPADM

## 2020-03-16 RX ORDER — PROPOFOL 10 MG/ML
INJECTION, EMULSION INTRAVENOUS AS NEEDED
Status: DISCONTINUED | OUTPATIENT
Start: 2020-03-16 | End: 2020-03-16 | Stop reason: HOSPADM

## 2020-03-16 RX ADMIN — PROPOFOL 140 MCG/KG/MIN: 10 INJECTION, EMULSION INTRAVENOUS at 09:57

## 2020-03-16 RX ADMIN — PROPOFOL 50 MG: 10 INJECTION, EMULSION INTRAVENOUS at 09:56

## 2020-03-16 RX ADMIN — PROPOFOL 50 MG: 10 INJECTION, EMULSION INTRAVENOUS at 09:57

## 2020-03-16 RX ADMIN — SODIUM CHLORIDE, SODIUM LACTATE, POTASSIUM CHLORIDE, AND CALCIUM CHLORIDE 100 ML/HR: 600; 310; 30; 20 INJECTION, SOLUTION INTRAVENOUS at 10:00

## 2020-03-16 NOTE — OP NOTES
Esophagogastroduodenoscopy    DATE of PROCEDURE: 3/16/2020    INDICATION: Dysphagia    POSTPROCEDURE DIAGNOSIS:  gastritis    MEDICATIONS ADMINISTERED: MAC anesthesia (see anesthesia report)    INSTRUMENT: GIF H190    PROCEDURE:  After obtaining informed consent, the patient was placed in the left lateral position and sedated. The endoscope was advanced under direct vision without difficulty. The esophagus, stomach (including retroflexed views) and duodenum were evaluated. The patient was taken to the recovery area in stable condition.     FINDINGS:  ESOPHAGUS:  Distal esophageal ring dilated 12-15  STOMACH:  Gastritis, biopsied  DUODENUM:  Normal, biopsies obtained    Estimated blood loss: 0-minimal   Specimens obtained during procedure: Gastritis, Small Intestine    PLAN:  Follow path, rpt dilation prn

## 2020-03-16 NOTE — ROUTINE PROCESS
Late entry MD to bedside to speak with the pt, written and verbal instructions reviewed with both, written instructions taken by the pt's friend, vss, the pt was discharged via wheelchair by staff to personal car driven by the pt's friend, safety was maintained at all times.

## 2020-03-16 NOTE — ANESTHESIA PREPROCEDURE EVALUATION
Anesthetic History   No history of anesthetic complications            Review of Systems / Medical History  Patient summary reviewed and pertinent labs reviewed    Pulmonary        Sleep apnea: CPAP           Neuro/Psych   Within defined limits           Cardiovascular                  Exercise tolerance: >4 METS     GI/Hepatic/Renal     GERD: well controlled          Comments: H/o gastro paresis Endo/Other    Diabetes: well controlled, type 2, using insulin         Other Findings   Comments: Full prep, no emesis.            Physical Exam    Airway  Mallampati: II  TM Distance: 4 - 6 cm  Neck ROM: normal range of motion   Mouth opening: Normal     Cardiovascular  Regular rate and rhythm,  S1 and S2 normal,  no murmur, click, rub, or gallop  Rhythm: regular  Rate: normal      Pertinent negatives: No murmur   Dental         Pulmonary  Breath sounds clear to auscultation               Abdominal  GI exam deferred       Other Findings            Anesthetic Plan    ASA: 3  Anesthesia type: total IV anesthesia          Induction: Intravenous  Anesthetic plan and risks discussed with: Patient

## 2020-03-16 NOTE — H&P
[facility] H&P (Admission)        Date:  3/16/2020    Primary GI Physician:  anmol    Chief Complaint: diarrhea, dysphagia    Subjective:     History of Present Illness:  Patient is a 71 y.o. male with PMH of Type 1 DM, Barrets who is being admitted for elective endoscopy. PMH:  Past Medical History:   Diagnosis Date    Anxiety     Arthritis     hands, knees    Singh esophagus     Cataracts, bilateral     Depression     Deviated nasal septum 5/1/2014    Diabetes (HCC)     type l , last A1C was 7.6. patient is a very brittle type l diabetic    Gastroparesis     GERD (gastroesophageal reflux disease)     managed with medication    Glaucoma     right eye    Osteoporosis     Prostate hypertrophy     managed with medication    Sleep apnea     wears cpap       PSH:  Past Surgical History:   Procedure Laterality Date    HX CARPAL TUNNEL RELEASE Bilateral     lt x 2 , rt x 1    HX CATARACT REMOVAL Bilateral     with iol    HX COLONOSCOPY      HX ENDOSCOPY      x 3 with dialation    HX HIP FRACTURE TX Left     lt hip    HX ORTHOPAEDIC Left      left foot ankle withhardware    HX ORTHOPAEDIC      carpal tunel x3    HX ORTHOPAEDIC Left     left shoulder repair with hardware    HX ORTHOPAEDIC      left femur repair with hardware, with ball     HX ORTHOPAEDIC      right femur    HX ORTHOPAEDIC      left wrist with hardware    HX OTHER SURGICAL Bilateral     TFN       Allergies:  No Known Allergies    Home Medications:  Prior to Admission medications    Medication Sig Start Date End Date Taking? Authorizing Provider   timoloL (BetimoL) 0.5 % ophthalmic solution Administer 1 Drop to right eye nightly. use in affected eye(s)   Yes Provider, Historical   esomeprazole (NexIUM) 40 mg capsule Take  by mouth daily. Yes Provider, Historical   FLUoxetine (PROzac) 40 mg capsule Take 40 mg by mouth every morning.    Yes Provider, Historical   insulin aspart U-100 (NOVOLOG) 100 unit/mL injection 3am 1.6 unis/hr  MN 0.25 units am  6am 0.5 units / hr  11am 0.3 units /hr  1500 0.35 units / hour  1700. 0.3 units /hr  2200 .525 units / hr 8/29/18  Yes Provider, Historical   Blood-Glucose Sensor erx UAD 2/12/18  Yes Provider, Historical   glucose blood VI test strips (ASCENSIA AUTODISC VI, ONE TOUCH ULTRA TEST VI) strip for bg testing 6 x per day  while on pump, dx code E10.42, 90 day supply 1/9/18  Yes Provider, Historical   Pump Set misc Basal Rate :  MN-0.325**  , 3AM-1.55 , 6AM- 0.525**   ,11AM-0.425 , 3PM-0.300,  7PM-0.300,10PM-0.575  Carb Ratios :  MN-21**  ,11AM-19** , 4PM-16 , 8PM-8  Correction Factor :  MN-75  , 5AM-37   ,8PM-60    TARGET [id number], 5AM-100-120, 8PM-110. .. 7/18/18  Yes Provider, Historical   montelukast (SINGULAIR) 10 mg tablet Take 10 mg by mouth daily. Indications: morning   Yes Provider, Historical   aspirin 81 mg chewable tablet Take 81 mg by mouth daily. Take day of surgery per anesthesia protocol. Yes Provider, Historical   insulin aspart (NOVOLOG) 100 unit/mL injection by SubCUTAneous route. Insulin pump-basal rate-midnight-0300=0.37u, 3166-3487=1.55u, 5938-2981=0.55u,1832-9602=0.425u,7568-8063=0.4u,6792-6061=0.35u,-9881-1058=0.4u  Continue basal rate  Indications: type 1 diabetes mellitus   Yes Provider, Historical   LIPITOR 20 mg Tab Take 20 mg by mouth every morning. Take day of surgery per anesthesia protocol. Yes Provider, Historical   TRENTAL 400 mg TbSR Take 400 mg by mouth three (3) times daily (with meals). Per Rachel Cancino - pt can take DOS   Yes Provider, Historical   CALCIUM 600 mg Cap Take 1 Tab by mouth three (3) times daily. Calcium carbonate and vitamin D 600mg-125 units  Stop seven days prior to surgery per anesthesia protocol. Yes Provider, Historical   naproxen (NAPROSYN) 500 mg tablet Take 500 mg by mouth three (3) times daily (with meals). Provider, Historical   fludrocortisone (FLORINEF) 0.1 mg tablet 0.1 mg two (2) times a day. Indications: a feeling of dizziness upon standing due to a drop in blood pressure 9/2/18   Provider, Historical   glucagon (GLUCAGEN) 1 mg injection UAD when necessary for severe hypoglycemia, include syringe and vial 4/30/18   Provider, Historical       Hospital Medications:  Current Facility-Administered Medications   Medication Dose Route Frequency    lactated Ringers infusion  100 mL/hr IntraVENous CONTINUOUS       Social History:  Social History     Tobacco Use    Smoking status: Never Smoker    Smokeless tobacco: Never Used   Substance Use Topics    Alcohol use: No       Pt denies any history of drug use, tattoos, or blood transfusions. Family History:  Family History   Problem Relation Age of Onset    Heart Failure Mother     Cancer Father         prostate    Kidney Disease Father     Heart Disease Father        Review of Systems:  A detailed 10 system ROS is obtained, with pertinent positives as listed above. All others are negative. Objective:     Physical Exam:  Vitals:  Visit Vitals  /65   Pulse 92   Temp 97.9 °F (36.6 °C)   Resp 14   SpO2 100%     Gen:  Pt is alert, cooperative, no acute distress  Skin:  Extremities and face reveal no rashes. HEENT: Sclerae anicteric. Extra-occular muscles are intact. No oral ulcers. No abnormal pigmentation of the lips. The neck is supple. Cardiovascular: Regular rate and rhythm. No murmurs, gallops, or rubs. Respiratory:  Comfortable breathing with no accessory muscle use. Clear breath sounds with no wheezes, rales, or rhonchi. GI:  Abdomen nondistended, soft, and nontender. Normal active bowel sounds. No enlargement of the liver or spleen. No masses palpable. Rectal:  Deferred  Musculoskeletal:  No pitting edema of the lower legs. Neurological:  Gross memory appears intact. Patient is alert and oriented. Psychiatric:  Mood appears appropriate with judgement intact.   Lymphatic:  No cervical or supraclavicular adenopathy. Laboratory:    No results for input(s): WBC, HGB, HCT, PLT, MCV, NA, K, CL, CO2, BUN, CREA, CA, MG, GLU, AP, SGOT, GPT, ALT, TBIL, TBILI, CBIL, ALB, TP, AML, LPSE, PTP, INR, APTT, HGBEXT, HCTEXT, PLTEXT, INREXT in the last 72 hours. No lab exists for component: DBIL    Assessment:       Active Problems:    * No active hospital problems. *      Plan:       Diarrhea, Dysphagia-  Plan EGD and colonoscopy, risks benefits alternatives of which were described.   He wishes to proceed

## 2020-03-16 NOTE — DISCHARGE INSTRUCTIONS
Gastrointestinal Esophagogastroduodenoscopy (EGD) - Upper Exam Discharge Instructions    1. Call Dr. Rodo Byrne at [phone] for any problems or questions. 2. Contact the doctor's office for follow up appointment as directed. 3. Medication may cause drowsiness for several hours, therefore:  · Do not drive or operate machinery for remainder of the day. · No alcohol today. · Do not make any important or legal decisions for 24 hours. · Do not sign any legal documents for 24 hours. 5. Ordinarily, you may resume regular diet and activity after exam unless otherwise specified by your physician. 6. For mild soreness in your throat you may use Cepacol throat lozenges or warm salt-water gargles as needed. Any additional instructions:      Repeat dilation as needed     Instructions given to Whitney Silver and other family members. Gastrointestinal Colonoscopy/Flexible Sigmoidoscopy - Lower Exam Discharge Instructions  1. Call Dr. Damien Otto at [facility] for any problems or questions. 2. Contact the doctors office for follow up appointment as directed  3. Medication may cause drowsiness for several hours, therefore:  · Do not drive or operate machinery for reminder of the day. · No alcohol today. · Do not make any important or legal decisions for 24 hours. · Do not sign any legal documents for 24 hours. 4. Ordinarily, you may resume regular diet and activity after exam unless otherwise specified by your physician. 5. Because of air put into your colon during exam, you may experience some abdominal distension, relieved by the passage of gas, for several hours. 6. Contact your physician if you have any of the following:  · Excessive amount of bleeding - large amount when having a bowel movement. Occasional specks of blood with bowel movement would not be unusual.  · Severe abdominal pain  · Fever or Chills  Any additional instructions:       Follow-up with physician regarding pathology. Instructions given to Zuri Horn and other family members.

## 2020-03-16 NOTE — ANESTHESIA POSTPROCEDURE EVALUATION
Procedure(s):  ESOPHAGOGASTRODUODENOSCOPY (EGD)  COLONOSCOPY/BMI 26  ESOPHAGOGASTRODUODENAL (EGD) BIOPSY  ESOPHAGEAL DILATION  COLON BIOPSY. total IV anesthesia    Anesthesia Post Evaluation      Multimodal analgesia: multimodal analgesia used between 6 hours prior to anesthesia start to PACU discharge  Patient location during evaluation: bedside  Patient participation: complete - patient participated  Level of consciousness: awake and responsive to light touch  Pain management: adequate  Airway patency: patent  Anesthetic complications: no  Cardiovascular status: acceptable, hemodynamically stable, blood pressure returned to baseline and stable  Respiratory status: acceptable, unassisted, spontaneous ventilation and nonlabored ventilation  Hydration status: acceptable  Post anesthesia nausea and vomiting:  controlled      Vitals Value Taken Time   BP 97/49 3/16/2020 10:35 AM   Temp 36.7 °C (98 °F) 3/16/2020 10:35 AM   Pulse 80 3/16/2020 10:37 AM   Resp 14 3/16/2020 10:35 AM   SpO2 99 % 3/16/2020 10:37 AM   Vitals shown include unvalidated device data.

## 2020-03-16 NOTE — OP NOTES
COLONOSCOPY    DATE of PROCEDURE: 3/16/2020    INDICATION: diarrhea    POSTPROCEDURE DIAGNOSIS:    MEDICATION:   MAC anesthesia (see anesthesia report)    INSTRUMENT: [id number]    PROCEDURE: After obtaining informed consent, the patient was placed in the left lateral position and sedated. The endoscope was advanced to the cecum where the appendiceal orifice and ileocecal valve were identified. On withdrawal, the colon was carefully visualized in a 360 degree fashion, looking between folds and proximal and distal aspect of the folds. Random biopsies were obtained throughout the colon. The patient was taken to the recovery area in stable condition.     FINDINGS:  Rectum: normal  Sigmoid: normal  Descending Colon: normal  Transverse Colon: normal  Ascending Colon: normal  Cecum: normal  Terminal ileum: not entered    Bowel Prep: Good  Estimated blood loss: 0-minimal   Specimens obtained during procedure: random biopsies    ASSESSMENT/PLAN:  Diarrhea, await path

## 2020-03-16 NOTE — PROGRESS NOTES
's pre-procedure visit and prayer with patient as requested.     Merline Cough, MDiv, BS  Board Certified

## 2020-07-06 PROBLEM — K21.9 GASTROESOPHAGEAL REFLUX DISEASE WITHOUT ESOPHAGITIS: Status: ACTIVE | Noted: 2020-07-06

## 2020-07-06 PROBLEM — I25.10 CORONARY ARTERY DISEASE INVOLVING NATIVE CORONARY ARTERY OF NATIVE HEART WITHOUT ANGINA PECTORIS: Status: ACTIVE | Noted: 2020-07-06

## 2020-07-07 PROBLEM — I95.1 POSTURAL HYPOTENSION: Status: ACTIVE | Noted: 2020-07-07

## 2020-07-07 PROBLEM — E78.5 DYSLIPIDEMIA: Status: ACTIVE | Noted: 2020-07-07

## 2020-07-07 PROBLEM — I10 ESSENTIAL HYPERTENSION: Status: ACTIVE | Noted: 2020-07-07

## 2022-03-18 PROBLEM — K21.9 GASTROESOPHAGEAL REFLUX DISEASE WITHOUT ESOPHAGITIS: Status: ACTIVE | Noted: 2020-07-06

## 2022-03-19 PROBLEM — I10 ESSENTIAL HYPERTENSION: Status: ACTIVE | Noted: 2020-07-07

## 2022-03-19 PROBLEM — I25.10 CORONARY ARTERY DISEASE INVOLVING NATIVE CORONARY ARTERY OF NATIVE HEART WITHOUT ANGINA PECTORIS: Status: ACTIVE | Noted: 2020-07-06

## 2022-03-19 PROBLEM — I95.1 POSTURAL HYPOTENSION: Status: ACTIVE | Noted: 2020-07-07

## 2022-03-19 PROBLEM — E78.5 DYSLIPIDEMIA: Status: ACTIVE | Noted: 2020-07-07

## 2022-03-20 PROBLEM — Z80.42 FAMILY HISTORY OF PROSTATE CANCER IN FATHER: Status: ACTIVE | Noted: 2018-03-14

## 2022-06-06 ENCOUNTER — TELEPHONE (OUTPATIENT)
Dept: CARDIOLOGY CLINIC | Age: 67
End: 2022-06-06

## 2022-06-06 NOTE — TELEPHONE ENCOUNTER
Pt came into the [facility]  and dropped off blood pressure readings that Dr. Nicol Abdalla requested if any questions pt can be reached at [phone]

## 2022-06-07 NOTE — PROGRESS NOTES
[address] [place] [address] Court, [address] [facility]  [phone]    Patient Name:  Latonya Diggs  YOB: 1955      Office Visit 6/8/2022    CHIEF COMPLAINT:    Chief Complaint   Patient presents with    Sleep Apnea    Follow-up         HISTORY OF PRESENT ILLNESS:  Patient is a 79 yo  male seen today for follow up of sleep apnea. He is prescribed cpap therapy with a humidifier set at 11cm with a full face mask. Most recent download reveals AHI on PAP therapy is 1.8, leak is 9.7 and the hourly usage is 6 hours 36 minutes nightly. The overall use is 594 hours with days greater than four hours at 84/90. The patient is compliant with the Pap therapy and is feeling better as a result. Patient states he is feeling rested in the morning, does have some daytime fatigue with occasional napping. Current [place] score is 6/24. He is falling asleep easily and denies any nocturnal awakenings. Since last visit he denies any significant change to medical history or weight change. He states his CPAP is giving him a motor life error. It is greater than 11years old so we will order replacement machine today. Past Medical History:   Diagnosis Date    Anxiety     Arthritis     hands, knees    Chavez esophagus     Cataracts, bilateral     Depression     Deviated nasal septum 5/1/2014    Diabetes ([facility])     type l , last A1C was 7.6.  patient is a very brittle type l diabetic    Gastroparesis     GERD (gastroesophageal reflux disease)     managed with medication    Glaucoma     right eye    Osteoporosis     Prostate hypertrophy     managed with medication    Sleep apnea     wears cpap         Patient Active Problem List   Diagnosis    Incontinence    NAINA on CPAP    Gastroesophageal reflux disease without esophagitis    Hip fracture ([address]    BPH with obstruction/lower urinary tract symptoms    Postural hypotension    Controlled type 2 diabetes mellitus, with long-term current use of insulin ([facility])    Essential hypertension    Dyslipidemia    Urinary retention    Deviated nasal septum    Coronary artery disease involving native coronary artery of native heart without angina pectoris    Osteoporosis    Nocturia    Family history of prostate cancer in father          Past Surgical History:   Procedure Laterality Date    CARPAL TUNNEL RELEASE Bilateral     lt x 2 , rt x 1    CATARACT REMOVAL Bilateral     with iol    COLONOSCOPY N/A 3/16/2020    COLONOSCOPY/BMI 26 performed by Skyler Salgado [facility] at [facility] ENDOSCOPY    COLONOSCOPY      HIP FRACTURE SURGERY Left     lt hip    ORTHOPEDIC SURGERY      right femur    ORTHOPEDIC SURGERY Left     left shoulder repair with hardware    ORTHOPEDIC SURGERY      left wrist with hardware    ORTHOPEDIC SURGERY      left femur repair with hardware, with ball     ORTHOPEDIC SURGERY Left      left foot ankle withhardware    ORTHOPEDIC SURGERY      carpal tunel x3    OTHER SURGICAL HISTORY Bilateral     TFN    UPPER GASTROINTESTINAL ENDOSCOPY      x 3 with dialation           Social History     Socioeconomic History    Marital status: Single     Spouse name: Not on file    Number of children: Not on file    Years of education: Not on file    Highest education level: Not on file   Occupational History    Not on file   Tobacco Use    Smoking status: Never Smoker    Smokeless tobacco: Never Used   Substance and Sexual Activity    Alcohol use: No    Drug use: No    Sexual activity: Not on file   Other Topics Concern    Not on file   Social History Narrative    Not on file     Social Determinants of Health     Financial Resource Strain:     Difficulty of Paying Living Expenses: Not on file   Food Insecurity:     Worried About Running Out of Food in the Last Year: Not on file    Earlene of Food in the Last Year: Not on file   Transportation Needs:     Lack of Transportation (Medical):  Not on file    Lack of Transportation (Non-Medical): Not on file   Physical Activity:     Days of Exercise per Week: Not on file    Minutes of Exercise per Session: Not on file   Stress:     Feeling of Stress : Not on file   Social Connections:     Frequency of Communication with Friends and Family: Not on file    Frequency of Social Gatherings with Friends and Family: Not on file    Attends Latter-day Services: Not on file    Active Member of [address] [place] [address] or Organizations: Not on file    Attends Club or Organization Meetings: Not on file    Marital Status: Not on file   Intimate Partner Violence:     Fear of Current or Ex-Partner: Not on file    Emotionally Abused: Not on file    Physically Abused: Not on file    Sexually Abused: Not on file   Housing Stability:     Unable to Pay for Housing in the Last Year: Not on file    Number of [place] in the Last Year: Not on file    Unstable Housing in the Last Year: Not on file         Family History   Problem Relation Age of Onset    Cancer Father         prostate    Heart Disease Father     Kidney Disease Father     Heart Failure Mother          No Known Allergies      Current Outpatient Medications   Medication Sig    atorvastatin (LIPITOR) 20 MG tablet Take 20 mg by mouth    esomeprazole (NEXIUM) 40 MG delayed release capsule Take by mouth daily    fludrocortisone (FLORINEF) 0.1 MG tablet Take 0.1 mg by mouth 2 times daily    FLUoxetine (PROZAC) 40 MG capsule Take 40 mg by mouth    insulin aspart (NOVOLOG) 100 UNIT/ML injection vial 3am 1.6 unis/hrMN 0.25 units am6am 0.5 units / hr11am 0.3 units /uv7145 0.35 units / [id number].  0.3 units /ui0534 .525 units / hr    montelukast (SINGULAIR) 10 MG tablet Take 10 mg by mouth daily    naproxen (NAPROSYN) 500 MG tablet Take 500 mg by mouth 3 times daily (with meals)    pentoxifylline (TRENTAL) 400 MG extended release tablet Take 400 mg by mouth 3 times daily (with meals)    timolol (BETIMOL) 0.5 % ophthalmic solution Apply 1 drop to eye     No current facility-administered medications for this visit. REVIEW OF SYSTEMS:   CONSTITUTIONAL:   There is no history of fever, chills, night sweats, weight loss, weight gain, persistent fatigue, or lethargy/hypersomnolence. CARDIAC:   No chest pain, pressure, discomfort, palpitations, orthopnea, murmurs, or edema. GI:   No dysphagia, heartburn reflux, nausea/vomiting, diarrhea, abdominal pain, or bleeding. NEURO:   There is no history of AMS, persistent headache, decreased level of consciousness, seizures, or motor or sensory deficits. PHYSICAL EXAM:    Vitals:    06/08/22 0856   BP: 138/66   Pulse: (!) 6   Resp: 14   Temp: 97.1 °F (36.2 °C)   SpO2: 98%        GENERAL APPEARANCE:   The patient is normal weight and in no respiratory distress. HEENT:   PERRL. Conjunctivae unremarkable. Nasal mucosa is without epistaxis, exudate, or polyps. Gums and dentition are unremarkable. There is oropharyngeal narrowing. TMs are clear. NECK/LYMPHATIC:   Symmetrical with no elevation of jugular venous pulsation. Trachea midline. No thyroid enlargement. No cervical adenopathy. LUNGS:   Normal respiratory effort with symmetrical lung expansion. Breath sounds clear. HEART:   There is a regular rate and rhythm. No murmur, rub, or gallop. There is no edema in the lower extremities. ABDOMEN:   Soft and non-tender. No hepatosplenomegaly. Bowel sounds are normal.     NEURO:   The patient is alert and oriented to person, place, and time. Memory appears intact and mood is normal.  No gross sensorimotor deficits are present. ASSESSMENT:  (Medical Decision Making)      Diagnosis Orders   1. NAINA on CPAP AHI is well controlled on Pap therapy. Patient is benefiting and tolerating from therapy. His current CPAP is greater than 11years old and is showing a motor life air. We will renew supplies and order new CPAP today.  Medicare compliance discussed with patient. Minimally 4 hours nightly, every night, 70% of the time, which is 21/30 days over 4 hours. Our goal is for you to wear the CPAP the entire night's sleep. DME - DURABLE MEDICAL EQUIPMENT        PLAN:  Continue CPAP with nightly compliance. Will order replacement machine and renew supplies today. Follow-up will be 4 months  to document compliance and then can be seen annually after that. Orders Placed This Encounter   Procedures    [facility] [address] [facility]  Phone: [address] [employer] [address] [address]  Dept: [phone]      Patient Name: Latonya Diggs  : 1955  Gender: male  Address: [address] [place] [id number]  Patient phone number: [phone] (home)       Primary Insurance: Payor: Shanti Weeks / Plan: MEDICARE PART A AND B / Product Type: *No Product type* /   Subscriber ID: [id number] - (Medicare)      AMB Supply Order  Order Details     DME Location: Resource   Order Date: 2022   The encounter diagnosis was NAINA on CPAP.           (  X   )New Set-Up- replacement machine     autoCPAP machine   (     )[id number] CPAP Unit  (     )[id number] Auto CPAP Unit  (     ) BiLevel Unit  (     ) Auto BiLevel Unit  (     ) ASV   (     ) Bilevel ST    (     ) Oxygen Concentrator         Length of need: 12 months    Pressure: 11  cmH20  EPR:      Starting Ramp Pressure:   cm H20  Ramp Time: min      Patient had a diagnostic Apnea Hypopnea Index (AHI) of :      *SUPPLIES* Replace all as needed, or per coverage guidelines     Masks Type:    (  x   ) -Full Face Mask (1 per 3 mon)  ( x    ) -Full Mask (1 per month) Interface/Cushion      (     ) -Nasal Mask (1 per 3 mon)  (     ) - Nasal Mask (1 per month) Interface/Cushion  (     ) -Pillow (2 per mon)  (     ) -Bvxklhzay (1 per 6 mon)      _________________________________________________________________          Other Supplies:    (  X   )-Onchlupo (1 per 6 mon)  ( X    )-Ndhaps Tubing (1 per 3 mon)  (  X   )- Disposable Filter (2 per mon)  (   X  )-Lovvhm Humidifier (1 per year)     (  x   )-Avryxeuhv (sometimes used with Full Face Mask) (1 per 6 mos)  (     )-Tubing-without heat (1 per 3 mos)  ( X   )-Non-Disposable Filter (1 per 6 mos)  (   x  )-Water Chamber (1 per 6 mos)  (     )-Humidifier non-heated (1 per 5 yrs)      Signed Date: 6/8/2022  Electronically Signed By: ELIAS Crandall CNP     Collaborating Physician: Dr. Margy Odom    Over 50% of today's office visit was spent in face to face time reviewing test results, prognosis, importance of compliance, education about disease process, benefits of medications, instructions for management of acute flare-ups, and follow up plans. Total face to face time spent with patient was 20 minutes.         ELIAS Crandall CNP  Electronically signed

## 2022-06-08 ENCOUNTER — OFFICE VISIT (OUTPATIENT)
Dept: SLEEP MEDICINE | Age: 67
End: 2022-06-08
Payer: MEDICARE

## 2022-06-08 VITALS
TEMPERATURE: 97.1 F | DIASTOLIC BLOOD PRESSURE: 66 MMHG | OXYGEN SATURATION: 98 % | HEIGHT: 76 IN | HEART RATE: 6 BPM | BODY MASS INDEX: 23.38 KG/M2 | RESPIRATION RATE: 14 BRPM | WEIGHT: 192 LBS | SYSTOLIC BLOOD PRESSURE: 138 MMHG

## 2022-06-08 DIAGNOSIS — G47.33 OSA ON CPAP: Primary | ICD-10-CM

## 2022-06-08 DIAGNOSIS — Z99.89 OSA ON CPAP: Primary | ICD-10-CM

## 2022-06-08 PROCEDURE — 1036F TOBACCO NON-USER: CPT | Performed by: STUDENT IN AN ORGANIZED HEALTH CARE EDUCATION/TRAINING PROGRAM

## 2022-06-08 PROCEDURE — 99213 OFFICE O/P EST LOW 20 MIN: CPT | Performed by: STUDENT IN AN ORGANIZED HEALTH CARE EDUCATION/TRAINING PROGRAM

## 2022-06-08 PROCEDURE — 3017F COLORECTAL CA SCREEN DOC REV: CPT | Performed by: STUDENT IN AN ORGANIZED HEALTH CARE EDUCATION/TRAINING PROGRAM

## 2022-06-08 PROCEDURE — 1123F ACP DISCUSS/DSCN MKR DOCD: CPT | Performed by: STUDENT IN AN ORGANIZED HEALTH CARE EDUCATION/TRAINING PROGRAM

## 2022-06-08 PROCEDURE — G8420 CALC BMI NORM PARAMETERS: HCPCS | Performed by: STUDENT IN AN ORGANIZED HEALTH CARE EDUCATION/TRAINING PROGRAM

## 2022-06-08 PROCEDURE — G8427 DOCREV CUR MEDS BY ELIG CLIN: HCPCS | Performed by: STUDENT IN AN ORGANIZED HEALTH CARE EDUCATION/TRAINING PROGRAM

## 2022-06-08 ASSESSMENT — SLEEP AND FATIGUE QUESTIONNAIRES
ESS TOTAL SCORE: 6
HOW LIKELY ARE YOU TO NOD OFF OR FALL ASLEEP WHILE SITTING AND TALKING TO SOMEONE: 0
HOW LIKELY ARE YOU TO NOD OFF OR FALL ASLEEP WHILE LYING DOWN TO REST IN THE AFTERNOON WHEN CIRCUMSTANCES PERMIT: 2
HOW LIKELY ARE YOU TO NOD OFF OR FALL ASLEEP WHILE WATCHING TV: 1
HOW LIKELY ARE YOU TO NOD OFF OR FALL ASLEEP IN A CAR, WHILE STOPPED FOR A FEW MINUTES IN TRAFFIC: 0
HOW LIKELY ARE YOU TO NOD OFF OR FALL ASLEEP WHILE SITTING INACTIVE IN A PUBLIC PLACE: 0
HOW LIKELY ARE YOU TO NOD OFF OR FALL ASLEEP WHILE SITTING QUIETLY AFTER LUNCH WITHOUT ALCOHOL: 2
HOW LIKELY ARE YOU TO NOD OFF OR FALL ASLEEP WHEN YOU ARE A PASSENGER IN A CAR FOR AN HOUR WITHOUT A BREAK: 0
HOW LIKELY ARE YOU TO NOD OFF OR FALL ASLEEP WHILE SITTING AND READING: 1

## 2022-09-06 ENCOUNTER — TELEPHONE (OUTPATIENT)
Dept: CARDIOLOGY CLINIC | Age: 67
End: 2022-09-06

## 2022-09-06 NOTE — TELEPHONE ENCOUNTER
Pt states saw NP at PCP for foot ulcer. /89, 171/92. Pt advised to FU with Cardiology. Appt Thphil 9/8/22  2:45p Dr. Danni Saba EA. First avail. Pt states eats a lot of frozen dinners. Encourage pt to reduce salt intake, choose Low Na frozen dinners. Pt voiced understanding and thanks.  cgh

## 2022-09-08 ENCOUNTER — OFFICE VISIT (OUTPATIENT)
Dept: CARDIOLOGY CLINIC | Age: 67
End: 2022-09-08
Payer: MEDICARE

## 2022-09-08 VITALS
WEIGHT: 203 LBS | HEIGHT: 76 IN | HEART RATE: 98 BPM | DIASTOLIC BLOOD PRESSURE: 80 MMHG | SYSTOLIC BLOOD PRESSURE: 155 MMHG | BODY MASS INDEX: 24.72 KG/M2

## 2022-09-08 DIAGNOSIS — Z99.89 OSA ON CPAP: ICD-10-CM

## 2022-09-08 DIAGNOSIS — K21.9 GASTROESOPHAGEAL REFLUX DISEASE WITHOUT ESOPHAGITIS: ICD-10-CM

## 2022-09-08 DIAGNOSIS — G47.33 OSA ON CPAP: ICD-10-CM

## 2022-09-08 DIAGNOSIS — R53.82 CHRONIC FATIGUE: ICD-10-CM

## 2022-09-08 DIAGNOSIS — I25.10 CORONARY ARTERY DISEASE INVOLVING NATIVE CORONARY ARTERY OF NATIVE HEART WITHOUT ANGINA PECTORIS: ICD-10-CM

## 2022-09-08 DIAGNOSIS — E78.5 DYSLIPIDEMIA: ICD-10-CM

## 2022-09-08 DIAGNOSIS — I10 ESSENTIAL HYPERTENSION: Primary | ICD-10-CM

## 2022-09-08 PROCEDURE — 1036F TOBACCO NON-USER: CPT | Performed by: INTERNAL MEDICINE

## 2022-09-08 PROCEDURE — G8427 DOCREV CUR MEDS BY ELIG CLIN: HCPCS | Performed by: INTERNAL MEDICINE

## 2022-09-08 PROCEDURE — 3017F COLORECTAL CA SCREEN DOC REV: CPT | Performed by: INTERNAL MEDICINE

## 2022-09-08 PROCEDURE — G8420 CALC BMI NORM PARAMETERS: HCPCS | Performed by: INTERNAL MEDICINE

## 2022-09-08 PROCEDURE — 1123F ACP DISCUSS/DSCN MKR DOCD: CPT | Performed by: INTERNAL MEDICINE

## 2022-09-08 PROCEDURE — 99214 OFFICE O/P EST MOD 30 MIN: CPT | Performed by: INTERNAL MEDICINE

## 2022-09-08 RX ORDER — LOSARTAN POTASSIUM 25 MG/1
25 TABLET ORAL DAILY
Qty: 90 TABLET | Refills: 1 | Status: SHIPPED | OUTPATIENT
Start: 2022-09-08

## 2022-09-08 ASSESSMENT — ENCOUNTER SYMPTOMS
COUGH: 0
SORE THROAT: 0
CONSTIPATION: 0
ABDOMINAL DISTENTION: 0
ABDOMINAL PAIN: 0
SHORTNESS OF BREATH: 0
DIARRHEA: 0
PHOTOPHOBIA: 0

## 2022-09-08 NOTE — PROGRESS NOTES
[facility]  [address] [facility], [address]  PHONE: [phone]        NAME:  Vanita Brandt  : 1955  MRN: [id number]     PCP:  None Provider      SUBJECTIVE:   Vanita Brandt is a 78 y.o. male seen for a follow up visit regarding the following:     Chief Complaint   Patient presents with    Hypertension       HPI:     He presented recently to establish new cardiac care, previously cared for by Dr. Gia Azul with mild CAD in  by cath, and risk factors as noted below. Doing well since last visit without interval angina, CHF, palpitations, edema, presyncope or syncope but blood pressures been consistently elevated with systolics 314T to 637G despite cessation of Florinef since his last visit (see scanned BP log). Exam and ECG benign. Echo  and Summer 2020 basically normal.  Carotid duplex Summer 2020 with mild intimal thickening bilaterally at most.  Having recurrent exertional fatigue but staying very active in his yard. No associated chest discomfort but a longstanding diabetic with distal peripheral neuropathy. Having exertional dyspnea but appropriate for his level of exertion. No heart failure or arrhythmia symptoms. Blood pressure consistently elevated here today, not checking much at home, off florinef since last visit due to persistent HTN. Home BP log with sysotlics 514-330'R consistently. Creatinine recently 1.3-1.5. Adding low-dose ARB and checking BMP in 1 week and a walking/Lexiscan nuclear stress test with follow-up in a couple weeks. Encouraged to hydrate with at least 60 to 80 ounces of water daily. Currently he is drinking about a gallon of caffeinated iced tea on a daily basis, counseled to minimize caffeine to 1-2 drinks daily    Past Medical History, Past Surgical History, Family history, Social History, and Medications were all reviewed with the patient today and updated as necessary.      Current Outpatient Medications   Medication Sig Dispense Refill    losartan (COZAAR) 25 MG tablet Take 1 tablet by mouth daily 90 tablet 1    atorvastatin (LIPITOR) 20 MG tablet Take 20 mg by mouth      esomeprazole (NEXIUM) 40 MG delayed release capsule Take by mouth daily      FLUoxetine (PROZAC) 40 MG capsule Take 40 mg by mouth      insulin aspart (NOVOLOG) 100 UNIT/ML injection vial 3am 1.6 unis/hrMN 0.25 units am6am 0.5 units / hr11am 0.3 units /[id number] 0.35 units / dumn1014. 0.3 units /wn3854 .525 units / hr      montelukast (SINGULAIR) 10 MG tablet Take 10 mg by mouth daily      naproxen (NAPROSYN) 500 MG tablet Take 500 mg by mouth 3 times daily (with meals)      pentoxifylline (TRENTAL) 400 MG extended release tablet Take 400 mg by mouth 3 times daily (with meals)      timolol (BETIMOL) 0.5 % ophthalmic solution Apply 1 drop to eye       No current facility-administered medications for this visit.             No Known Allergies    Patient Active Problem List    Diagnosis Date Noted    Chronic fatigue 09/08/2022     Priority: Medium    Postural hypotension 07/07/2020    Essential hypertension 07/07/2020    Dyslipidemia 07/07/2020    Gastroesophageal reflux disease without esophagitis 07/06/2020    Coronary artery disease involving native coronary artery of native heart without angina pectoris 07/06/2020     Overview Note:     Mild CAD 30% in 2013 cath        Family history of prostate cancer in father 03/14/2018    Incontinence 08/31/2015    BPH with obstruction/lower urinary tract symptoms 08/31/2015    Urinary retention 08/31/2015    Osteoporosis 08/31/2015    Nocturia 08/31/2015    Hip fracture ([facility] [address] 07/30/2015    NAINA on CPAP 05/01/2014    Controlled type 2 diabetes mellitus, with long-term current use of insulin ([facility] [address] 05/01/2014    Deviated nasal septum 05/01/2014        Past Surgical History:   Procedure Laterality Date    CARPAL TUNNEL RELEASE Bilateral     lt x 2 , rt x 1    CATARACT REMOVAL Bilateral     with iol    COLONOSCOPY N/A 3/16/2020    COLONOSCOPY/BMI 26 performed by Hamilton Coppola MD at [facility] [address] Left     lt hip    ORTHOPEDIC SURGERY      right femur    ORTHOPEDIC SURGERY Left     left shoulder repair with hardware    ORTHOPEDIC SURGERY      left wrist with hardware    ORTHOPEDIC SURGERY      left femur repair with hardware, with ball     ORTHOPEDIC SURGERY Left      left foot ankle withhardware    ORTHOPEDIC SURGERY      carpal tunel x3    OTHER SURGICAL HISTORY Bilateral     TFN    UPPER GASTROINTESTINAL ENDOSCOPY      x 3 with dialation       Family History   Problem Relation Age of Onset    Cancer Father         prostate    Heart Disease Father     Kidney Disease Father     Heart Failure Mother         Social History     Tobacco Use    Smoking status: Never    Smokeless tobacco: Never   Substance Use Topics    Alcohol use: No       ROS:    Review of Systems   Constitutional:  Negative for appetite change, chills, diaphoresis and fatigue. HENT:  Negative for congestion, mouth sores, nosebleeds, sore throat and tinnitus. Eyes:  Negative for photophobia and visual disturbance. Respiratory:  Negative for cough and shortness of breath. Cardiovascular:  Negative for chest pain, palpitations and leg swelling. Gastrointestinal:  Negative for abdominal distention, abdominal pain, constipation and diarrhea. Endocrine: Negative for cold intolerance, heat intolerance, polydipsia and polyuria. Genitourinary:  Negative for dysuria and hematuria. Musculoskeletal:  Negative for arthralgias, joint swelling and myalgias. Skin:  Negative for rash. Allergic/Immunologic: Negative for environmental allergies and food allergies. Neurological:  Positive for dizziness. Negative for seizures, syncope and light-headedness. Hematological:  Negative for adenopathy. Does not bruise/bleed easily.    Psychiatric/Behavioral:  Negative for agitation, behavioral problems, dysphoric mood and hallucinations. The patient is not nervous/anxious. PHYSICAL EXAM:     Vitals:    09/08/22 1621 09/08/22 1636   BP: (!) 164/70 (!) 155/80   Pulse: 98    Weight: 203 lb (92.1 kg)    Height: 6' 4\" (1.93 m)       Wt Readings from Last 3 Encounters:   09/08/22 203 lb (92.1 kg)   06/08/22 192 lb (87.1 kg)   05/10/22 198 lb (89.8 kg)      BP Readings from Last 3 Encounters:   09/08/22 (!) 155/80   06/08/22 138/66   05/10/22 (!) 140/85        Physical Exam  Constitutional:       Appearance: Normal appearance. He is normal weight. HENT:      Head: Normocephalic and atraumatic. Nose: Nose normal.      Mouth/Throat:      Mouth: Mucous membranes are moist.      Pharynx: Oropharynx is clear. Eyes:      Extraocular Movements: Extraocular movements intact. Pupils: Pupils are equal, round, and reactive to light. Neck:      Vascular: No carotid bruit or JVD. Cardiovascular:      Rate and Rhythm: Normal rate and regular rhythm. Heart sounds: No murmur heard. No friction rub. No gallop. Pulmonary:      Effort: Pulmonary effort is normal.      Breath sounds: Normal breath sounds. No wheezing or rhonchi. Abdominal:      General: Abdomen is flat. Bowel sounds are normal. There is no distension. Palpations: Abdomen is soft. Tenderness: There is no abdominal tenderness. Musculoskeletal:         General: No swelling. Normal range of motion. Cervical back: Normal range of motion and neck supple. No tenderness. Skin:     General: Skin is warm and dry. Neurological:      General: No focal deficit present. Mental Status: He is alert and oriented to person, place, and time. Mental status is at baseline. Psychiatric:         Mood and Affect: Mood normal.         Behavior: Behavior normal.        Medical problems and test results were reviewed with the patient today.        No results found for: CHOL  No results found for: TRIG  No results found for: HDL  No results found for: LDLCHOLESTEROL, LDLCALC  No results found for: LABVLDL, VLDL  No results found for: CHOLHDLRATIO     No results found for: NA, K, CL, CO2, BUN, CREATININE, GLUCOSE, CALCIUM      No results for input(s): WBC, HGB, HCT, MCV, PLT in the last 720 hours. No results found for: LABA1C  No results found for: EAG     No results found for: BNP     No results found for: TSHFT4, TSH     No results found for any visits on 09/08/22. ASSESSMENT and PLAN    Diagnoses and all orders for this visit:      1. Coronary artery disease involving native coronary artery of native heart without angina pectoris - mild disease in 2013. Asymptomatic and active. No further evaluation needed at present. 2. NAINA on CPAP- likes CPAP and tolerating it well. Echo normal.       3. Controlled type 2 diabetes mellitus without complication, with long-term current use of insulin ([address]- per Dr. Arti Zepeda, see above. 4. Gastroesophageal reflux disease without esophagitis- stable, per PCP      5. Postural hypotension-largely resolved. Consistent systemic hypertension now. See below       6. Dyslipidemia- stable, per PCP- needs lipid and liver - referred to Dr. Kenya Desai at [facility] to establish new PCP care. 7.  Elevated BP-consistent, mild hypertension-persistently elevated systolics despite cessation of Florinef. Drinking lots of caffeinated beverages on a daily basis. See above. Trying to minimize sodium but remains hypertensive. Despite 5 minutes rest in the office he still has significant hypertension as noted above.     Minimize sodium  2-3 caffeinated drinks at most on a daily basis  Hydrate more with water on a daily basis to prevent postural hypotension  Add losartan 25 mg daily and check BMP in 1 week  Daily afternoon resting blood pressure and heart rate log   Follow-up and review blood pressure log after walking/Lexiscan nuclear stress test given recurrent exertional fatigue and diabetes status        Return in about 4 weeks (around 10/6/2022).          Nain Patel MD  9/8/2022  4:36 PM

## 2022-09-12 ENCOUNTER — APPOINTMENT (RX ONLY)
Dept: URBAN - METROPOLITAN AREA CLINIC 329 | Facility: CLINIC | Age: 67
Setting detail: DERMATOLOGY
End: 2022-09-12

## 2022-09-12 DIAGNOSIS — D18.0 HEMANGIOMA: ICD-10-CM

## 2022-09-12 DIAGNOSIS — L72.8 OTHER FOLLICULAR CYSTS OF THE SKIN AND SUBCUTANEOUS TISSUE: ICD-10-CM

## 2022-09-12 PROBLEM — D48.5 NEOPLASM OF UNCERTAIN BEHAVIOR OF SKIN: Status: ACTIVE | Noted: 2022-09-12

## 2022-09-12 PROBLEM — D18.01 HEMANGIOMA OF SKIN AND SUBCUTANEOUS TISSUE: Status: ACTIVE | Noted: 2022-09-12

## 2022-09-12 PROCEDURE — 99202 OFFICE O/P NEW SF 15 MIN: CPT | Mod: 25

## 2022-09-12 PROCEDURE — ? BIOPSY BY SHAVE METHOD

## 2022-09-12 PROCEDURE — ? COUNSELING

## 2022-09-12 PROCEDURE — 11102 TANGNTL BX SKIN SINGLE LES: CPT

## 2022-09-12 ASSESSMENT — LOCATION ZONE DERM
LOCATION ZONE: TRUNK
LOCATION ZONE: ARM

## 2022-09-12 ASSESSMENT — LOCATION SIMPLE DESCRIPTION DERM
LOCATION SIMPLE: LEFT SHOULDER
LOCATION SIMPLE: LEFT LOWER BACK
LOCATION SIMPLE: ABDOMEN
LOCATION SIMPLE: UPPER BACK

## 2022-09-12 ASSESSMENT — LOCATION DETAILED DESCRIPTION DERM
LOCATION DETAILED: INFERIOR THORACIC SPINE
LOCATION DETAILED: PERIUMBILICAL SKIN
LOCATION DETAILED: LEFT SUPERIOR MEDIAL MIDBACK
LOCATION DETAILED: LEFT POSTERIOR SHOULDER

## 2022-10-04 ENCOUNTER — TELEPHONE (OUTPATIENT)
Dept: CARDIOLOGY CLINIC | Age: 67
End: 2022-10-04

## 2022-10-04 NOTE — TELEPHONE ENCOUNTER
Pt c/o light headedness, dizziness since taking Losartan 25mg. Has stopped all caffeine and hydrating well with water. SBP consistently 120/<.   States needs to clean out gutters tomorrow. Advise do not work on roof/gutters with dizziness. Pt voiced understanding. Asks if dizziness related to Nuke results? Informed pt Darnell not read yet. Will be call re results when available.    Can he cut Losartan in 1/2? cgh

## 2022-10-04 NOTE — TELEPHONE ENCOUNTER
Had a Nuc this Am and he has been dizzy since on new med. Very dizzy today outside trying to blow leaves .  Please call

## 2022-10-04 NOTE — TELEPHONE ENCOUNTER
Hold losartan completely. Hydrate with 70-90 oz fluids daily. If systolics creep up >179 at home, resume losartan but at 12.5mg daily. Nuclear stress will be read in AM by me.

## 2022-10-05 ENCOUNTER — TELEPHONE (OUTPATIENT)
Dept: CARDIOLOGY CLINIC | Age: 67
End: 2022-10-05

## 2022-10-05 NOTE — TELEPHONE ENCOUNTER
Pt called back to check if his nuclear stress test results were sent to his phone. States he has some short-term memory issues and has not received them yet.  Also wanted to know if he could pick the results up from the [facility] office

## 2022-10-05 NOTE — TELEPHONE ENCOUNTER
Nuclear stress test looks good. No major abnormalities. Keep follow up visit. Called patient and left voice mail to return call to Dann Briones.

## 2023-01-02 ASSESSMENT — ENCOUNTER SYMPTOMS
ABDOMINAL DISTENTION: 0
DIARRHEA: 0
SHORTNESS OF BREATH: 0
PHOTOPHOBIA: 0
COUGH: 0
SORE THROAT: 0
ABDOMINAL PAIN: 0
CONSTIPATION: 0

## 2023-01-02 NOTE — PROGRESS NOTES
[facility]  [address] [facility], [address]  PHONE: [phone]        NAME:  Will Betancur  : 1955  MRN: [id number]     PCP:  Rosalba Geller MD      SUBJECTIVE:   Will Betancur is a 75 y.o. male seen for a follow up visit regarding the following:     Chief Complaint   Patient presents with    Hypertension     6 month follow up    Coronary Artery Disease       HPI:     He presented recently to establish new cardiac care, previously cared for by Dr. Rogelio Farah with mild CAD in 2013 by cath, and risk factors as noted below. Doing well since last visit without interval angina, CHF, palpitations, edema, presyncope or syncope but blood pressures been consistently elevated with systolics 171R to 538I despite cessation of Florinef since his last visit (see scanned BP log). Exam and ECG benign. Echo  and Summer 2020 basically normal.  Carotid duplex Summer 2020 with mild intimal thickening bilaterally at most.  Having recurrent exertional fatigue but staying very active in his yard. No associated chest discomfort but a longstanding diabetic with distal peripheral neuropathy. Having exertional dyspnea but appropriate for his level of exertion. No heart failure or arrhythmia symptoms. Blood pressure consistently elevated at our last visit and at home, added 25 mg losartan. Encouraged to hydrate with at least 60 to 80 ounces of water daily. Currently he is drinking about a gallon of caffeinated iced tea on a daily basis, counseled to minimize caffeine to 1-2 drinks daily. He became severely dizzy with 25 mg losartan, but after decreasing to 12.5 mg daily he is fairly stable. Recently he has only been taking the 12.5 mg dose if his systolic is over 758 in the mornings and he is fairly well controlled at the present time with systolics ranging from 010-531I, acceptable from my standpoint.     Nuclear stress test 10/4/2022: Nuclear Findings: LV perfusion is probably normal.There is a fixed inferior defect with normal wall motion consistent with diaphragmatic attenuation artifact.  There is no reversible ischemia noted.    Nuclear Findings: There is no evidence of transient ischemic dilation (TID).  Ratio of 0.87    Nuclear Findings: Normal left ventricular systolic function post-stress.    ECG: Resting ECG demonstrates normal sinus rhythm.  Stress ECG without change.    Stress Test: A pharmacological stress test was performed using lexiscan.  Pt stated he has not taken his Trental for around 2 weeks. The patient reported weakness in arms/legs, lightheaded feeling and no chest pain after Lexiscan.    Post-stress ejection fraction is 73%    Doing well since last visit without interval angina, CHF, palpitations, edema, presyncope or syncope.  Vitals controlled and tolerating meds well. Staying active without any significant limitations.   Raked leaves all day yesterday without any exertional symptoms or limitations.     Past Medical History, Past Surgical History, Family history, Social History, and Medications were all reviewed with the patient today and updated as necessary.     Current Outpatient Medications   Medication Sig Dispense Refill    aspirin 81 MG EC tablet Take 81 mg by mouth daily      calcium carb-cholecalciferol (CALCIUM 600+D3) 600-5 MG-MCG TABS tablet Take 1 tablet by mouth daily      losartan (COZAAR) 25 MG tablet Take 1 tablet by mouth daily 90 tablet 3    atorvastatin (LIPITOR) 20 MG tablet Take 20 mg by mouth      esomeprazole (NEXIUM) 40 MG delayed release capsule Take by mouth daily      FLUoxetine (PROZAC) 40 MG capsule Take 40 mg by mouth      insulin aspart (NOVOLOG) 100 UNIT/ML injection vial 3am 1.6 unis/hrMN 0.25 units am6am 0.5 units / hr11am 0.3 units /[id number] 0.35 units / smhn7735. 0.3 units /lp1862 .525 units / hr      montelukast (SINGULAIR) 10 MG tablet Take 10 mg by mouth daily      naproxen (NAPROSYN)  500 MG tablet Take 500 mg by mouth 3 times daily (with meals)      pentoxifylline (TRENTAL) 400 MG extended release tablet Take 400 mg by mouth 3 times daily (with meals)      timolol (BETIMOL) 0.5 % ophthalmic solution Apply 1 drop to eye       No current facility-administered medications for this visit.             No Known Allergies    Patient Active Problem List    Diagnosis Date Noted    Chronic fatigue 09/08/2022     Priority: Medium    Postural hypotension 07/07/2020     Priority: Low    Essential hypertension 07/07/2020     Priority: Low    Dyslipidemia 07/07/2020     Priority: Low    Gastroesophageal reflux disease without esophagitis 07/06/2020     Priority: Low    Coronary artery disease involving native coronary artery of native heart without angina pectoris 07/06/2020     Priority: Low     Overview Note:     Mild CAD 30% in 2013 cath        Family history of prostate cancer in father 03/14/2018     Priority: Low    Incontinence 08/31/2015     Priority: Low    BPH with obstruction/lower urinary tract symptoms 08/31/2015     Priority: Low    Urinary retention 08/31/2015     Priority: Low    Osteoporosis 08/31/2015     Priority: Low    Nocturia 08/31/2015     Priority: Low    Hip fracture ([address] 07/30/2015     Priority: Low    NAINA on CPAP 05/01/2014     Priority: Low    Controlled type 2 diabetes mellitus, with long-term current use of insulin ([address] 05/01/2014     Priority: Low    Deviated nasal septum 05/01/2014     Priority: Low        Past Surgical History:   Procedure Laterality Date    CARPAL TUNNEL RELEASE Bilateral     lt x 2 , rt x 1    CATARACT REMOVAL Bilateral     with iol    COLONOSCOPY N/A 3/16/2020    COLONOSCOPY/BMI 26 performed by Ave Hartmann MD at [address] Left     lt hip    ORTHOPEDIC SURGERY      right femur    ORTHOPEDIC SURGERY Left     left shoulder repair with hardware    ORTHOPEDIC SURGERY      left wrist with hardware    ORTHOPEDIC SURGERY      left femur repair with hardware, with ball     ORTHOPEDIC SURGERY Left      left foot ankle withhardware    ORTHOPEDIC SURGERY      carpal tunel x3    OTHER SURGICAL HISTORY Bilateral     TFN    UPPER GASTROINTESTINAL ENDOSCOPY      x 3 with dialation       Family History   Problem Relation Age of Onset    Cancer Father         prostate    Heart Disease Father     Kidney Disease Father     Heart Failure Mother         Social History     Tobacco Use    Smoking status: Never    Smokeless tobacco: Never   Substance Use Topics    Alcohol use: No       ROS:    Review of Systems   Constitutional:  Negative for appetite change, chills, diaphoresis and fatigue. HENT:  Negative for congestion, mouth sores, nosebleeds, sore throat and tinnitus. Eyes:  Negative for photophobia and visual disturbance. Respiratory:  Negative for cough and shortness of breath. Cardiovascular:  Negative for chest pain, palpitations and leg swelling. Gastrointestinal:  Negative for abdominal distention, abdominal pain, constipation and diarrhea. Endocrine: Negative for cold intolerance, heat intolerance, polydipsia and polyuria. Genitourinary:  Negative for dysuria and hematuria. Musculoskeletal:  Negative for arthralgias, joint swelling and myalgias. Skin:  Negative for rash. Allergic/Immunologic: Negative for environmental allergies and food allergies. Neurological:  Positive for dizziness. Negative for seizures, syncope and light-headedness. Hematological:  Negative for adenopathy. Does not bruise/bleed easily. Psychiatric/Behavioral:  Negative for agitation, behavioral problems, dysphoric mood and hallucinations. The patient is not nervous/anxious.        PHYSICAL EXAM:     Vitals:    01/03/23 1008   BP: 128/72   Pulse: 76   Weight: 201 lb 7 oz (91.4 kg)   Height: 6' 4\" (1.93 m)      Wt Readings from Last 3 Encounters:   01/03/23 201 lb 7 oz (91.4 kg)   10/04/22 203 lb (92.1 kg)   09/08/22 203 lb (92.1 kg)      BP Readings from Last 3 Encounters:   01/03/23 128/72   10/04/22 112/76   09/08/22 (!) 155/80        Physical Exam  Constitutional:       Appearance: Normal appearance. He is normal weight. HENT:      Head: Normocephalic and atraumatic. Nose: Nose normal.      Mouth/Throat:      Mouth: Mucous membranes are moist.      Pharynx: Oropharynx is clear. Eyes:      Extraocular Movements: Extraocular movements intact. Pupils: Pupils are equal, round, and reactive to light. Neck:      Vascular: No carotid bruit or JVD. Cardiovascular:      Rate and Rhythm: Normal rate and regular rhythm. Heart sounds: No murmur heard. No friction rub. No gallop. Pulmonary:      Effort: Pulmonary effort is normal.      Breath sounds: Normal breath sounds. No wheezing or rhonchi. Abdominal:      General: Abdomen is flat. Bowel sounds are normal. There is no distension. Palpations: Abdomen is soft. Tenderness: There is no abdominal tenderness. Musculoskeletal:         General: No swelling. Normal range of motion. Cervical back: Normal range of motion and neck supple. No tenderness. Skin:     General: Skin is warm and dry. Neurological:      General: No focal deficit present. Mental Status: He is alert and oriented to person, place, and time. Mental status is at baseline. Psychiatric:         Mood and Affect: Mood normal.         Behavior: Behavior normal.        Medical problems and test results were reviewed with the patient today. No results found for: CHOL  No results found for: TRIG  No results found for: HDL  No results found for: LDLCHOLESTEROL, LDLCALC  No results found for: LABVLDL, VLDL  No results found for: CHOLHDLRATIO     No results found for: NA, K, CL, CO2, BUN, CREATININE, GLUCOSE, CALCIUM      No results for input(s): WBC, HGB, HCT, MCV, PLT in the last 720 hours.      No results found for: LABA1C  No results found for: EAG     No results found for: BNP No results found for: TSHFT4, TSH     No results found for any visits on 01/03/23. ASSESSMENT and PLAN    Diagnoses and all orders for this visit:      1. Coronary artery disease involving native coronary artery of native heart without angina pectoris - mild disease in 2013. Asymptomatic and active. No further evaluation needed at present. 2. NAINA on CPAP- likes CPAP and tolerating it well. Echo normal.       3. Controlled type 2 diabetes mellitus without complication, with long-term current use of insulin ([address]- per Dr. Vance Moody, see above. 4. Gastroesophageal reflux disease without esophagitis- stable, per PCP      5. Postural hypotension-largely resolved. Consistent systemic hypertension now. See below       6. Dyslipidemia- stable, per PCP- needs lipid and liver - referred to Dr. Harper Basurto at [facility] to establish new PCP care. 7.  Elevated BP-consistent, mild hypertension-persistently elevated systolics despite cessation of Florinef. Drinking lots of caffeinated beverages on a daily basis. See above. Trying to minimize sodium but remains hypertensive. Despite 5 minutes rest in the office he still has significant hypertension as noted above. Minimize sodium  2-3 caffeinated drinks at most on a daily basis  Hydrate more with water on a daily basis to prevent postural hypotension  Try losartan 12.5 mg every morning and call me with an update on blood pressures. If develops postural hypotension and dizziness with every morning dosing, fine to go back to as needed systolic greater than 094 dosing on a every morning basis. Daily afternoon resting blood pressure and heart rate log           Return in about 6 months (around 7/3/2023).          Petr Jaimes MD  1/3/2023  10:35 AM

## 2023-01-03 ENCOUNTER — OFFICE VISIT (OUTPATIENT)
Dept: CARDIOLOGY CLINIC | Age: 68
End: 2023-01-03
Payer: MEDICARE

## 2023-01-03 ENCOUNTER — TELEPHONE (OUTPATIENT)
Dept: CARDIOLOGY CLINIC | Age: 68
End: 2023-01-03

## 2023-01-03 VITALS
HEIGHT: 76 IN | WEIGHT: 201.44 LBS | SYSTOLIC BLOOD PRESSURE: 128 MMHG | HEART RATE: 76 BPM | BODY MASS INDEX: 24.53 KG/M2 | DIASTOLIC BLOOD PRESSURE: 72 MMHG

## 2023-01-03 DIAGNOSIS — Z99.89 OSA ON CPAP: ICD-10-CM

## 2023-01-03 DIAGNOSIS — I10 ESSENTIAL HYPERTENSION: ICD-10-CM

## 2023-01-03 DIAGNOSIS — K21.9 GASTROESOPHAGEAL REFLUX DISEASE WITHOUT ESOPHAGITIS: ICD-10-CM

## 2023-01-03 DIAGNOSIS — G47.33 OSA ON CPAP: ICD-10-CM

## 2023-01-03 DIAGNOSIS — I95.1 POSTURAL HYPOTENSION: ICD-10-CM

## 2023-01-03 DIAGNOSIS — I25.10 CORONARY ARTERY DISEASE INVOLVING NATIVE CORONARY ARTERY OF NATIVE HEART WITHOUT ANGINA PECTORIS: Primary | ICD-10-CM

## 2023-01-03 DIAGNOSIS — E78.5 DYSLIPIDEMIA: ICD-10-CM

## 2023-01-03 PROCEDURE — G8427 DOCREV CUR MEDS BY ELIG CLIN: HCPCS | Performed by: INTERNAL MEDICINE

## 2023-01-03 PROCEDURE — 99214 OFFICE O/P EST MOD 30 MIN: CPT | Performed by: INTERNAL MEDICINE

## 2023-01-03 PROCEDURE — 3074F SYST BP LT 130 MM HG: CPT | Performed by: INTERNAL MEDICINE

## 2023-01-03 PROCEDURE — 1123F ACP DISCUSS/DSCN MKR DOCD: CPT | Performed by: INTERNAL MEDICINE

## 2023-01-03 PROCEDURE — 3017F COLORECTAL CA SCREEN DOC REV: CPT | Performed by: INTERNAL MEDICINE

## 2023-01-03 PROCEDURE — 1036F TOBACCO NON-USER: CPT | Performed by: INTERNAL MEDICINE

## 2023-01-03 PROCEDURE — 3078F DIAST BP <80 MM HG: CPT | Performed by: INTERNAL MEDICINE

## 2023-01-03 PROCEDURE — G8420 CALC BMI NORM PARAMETERS: HCPCS | Performed by: INTERNAL MEDICINE

## 2023-01-03 PROCEDURE — G8484 FLU IMMUNIZE NO ADMIN: HCPCS | Performed by: INTERNAL MEDICINE

## 2023-01-03 RX ORDER — LOSARTAN POTASSIUM 25 MG/1
25 TABLET ORAL DAILY
Qty: 90 TABLET | Refills: 3 | Status: SHIPPED | OUTPATIENT
Start: 2023-01-03

## 2023-01-03 RX ORDER — ASPIRIN 81 MG
1 TABLET, DELAYED RELEASE (ENTERIC COATED) ORAL DAILY
COMMUNITY

## 2023-01-03 RX ORDER — ASPIRIN 81 MG/1
81 TABLET ORAL DAILY
COMMUNITY

## 2023-01-03 NOTE — TELEPHONE ENCOUNTER
If he tolerated the first few vaccines, I would certainly consider getting the omicron vaccine, just make sure its the same company (i.e. Pfizer/Moderna)

## 2023-01-03 NOTE — TELEPHONE ENCOUNTER
Pt states that he was seen in the office this morning and forgot to ask about the covid 19 vaccine. Pt states his PCP Dr. Lele Damon wanted pt to ask cardiologist about vaccine, they have new ones and needs to talk it over. Pt would appreciate a call back.

## 2023-01-31 ENCOUNTER — OFFICE VISIT (OUTPATIENT)
Dept: ORTHOPEDIC SURGERY | Age: 68
End: 2023-01-31
Payer: MEDICARE

## 2023-01-31 VITALS — HEIGHT: 77 IN | BODY MASS INDEX: 23.62 KG/M2 | WEIGHT: 200 LBS

## 2023-01-31 DIAGNOSIS — M70.62 TROCHANTERIC BURSITIS OF LEFT HIP: ICD-10-CM

## 2023-01-31 DIAGNOSIS — M25.552 LEFT HIP PAIN: Primary | ICD-10-CM

## 2023-01-31 DIAGNOSIS — M25.552 LEFT HIP PAIN: ICD-10-CM

## 2023-01-31 DIAGNOSIS — M70.62 TROCHANTERIC BURSITIS OF LEFT HIP: Primary | ICD-10-CM

## 2023-01-31 PROCEDURE — G8420 CALC BMI NORM PARAMETERS: HCPCS | Performed by: ORTHOPAEDIC SURGERY

## 2023-01-31 PROCEDURE — G8428 CUR MEDS NOT DOCUMENT: HCPCS | Performed by: ORTHOPAEDIC SURGERY

## 2023-01-31 PROCEDURE — 99203 OFFICE O/P NEW LOW 30 MIN: CPT | Performed by: ORTHOPAEDIC SURGERY

## 2023-01-31 PROCEDURE — 1123F ACP DISCUSS/DSCN MKR DOCD: CPT | Performed by: ORTHOPAEDIC SURGERY

## 2023-01-31 PROCEDURE — 3017F COLORECTAL CA SCREEN DOC REV: CPT | Performed by: ORTHOPAEDIC SURGERY

## 2023-01-31 PROCEDURE — 1036F TOBACCO NON-USER: CPT | Performed by: ORTHOPAEDIC SURGERY

## 2023-01-31 PROCEDURE — G8484 FLU IMMUNIZE NO ADMIN: HCPCS | Performed by: ORTHOPAEDIC SURGERY

## 2023-01-31 NOTE — PATIENT INSTRUCTIONS
Bursitis: Care Instructions  Overview     A bursa is a small sac of fluid that helps the tissues around a joint slide over one another easily. Injury or overuse of a joint can cause pain, redness, and inflammation in the bursa (bursitis). Bursitis usually gets better if you avoid the activity that caused it. You can help prevent bursitis from coming back by doing stretching and strengthening exercises. You may also need to change the way you do some activities. Follow-up care is a key part of your treatment and safety. Be sure to make and go to all appointments, and call your doctor if you are having problems. It's also a good idea to know your test results and keep a list of the medicines you take. How can you care for yourself at home? Put ice or a cold pack on the area for 10 to 20 minutes at a time. Try to do this every 1 to 2 hours for the next 3 days (when you are awake) or until the swelling goes down. Put a thin cloth between the ice and your skin. After the 3 days of using ice, you may use heat on the area. You can use a hot water bottle; a warm, moist towel; or a heating pad set on low. You can also try alternating heat and ice. Rest the area where you have pain. Stop any activities that cause pain. Switch to activities that do not stress the area. Take pain medicines exactly as directed. If the doctor gave you a prescription medicine for pain, take it as prescribed. If you are not taking a prescription pain medicine, ask your doctor if you can take an over-the-counter medicine. Do not take two or more pain medicines at the same time unless the doctor told you to. Many pain medicines have acetaminophen, which is Tylenol. Too much acetaminophen (Tylenol) can be harmful. To prevent stiffness, gently move the joint as much as you can without pain every day. As the pain gets better, keep doing range-of-motion exercises.  Ask your doctor for exercises that will make the muscles around the joint stronger. Do these as directed. You can slowly return to the activity that caused the pain, but do it with less effort until you can do it without pain or swelling. Be sure to warm up before and stretch after you do the activity. When should you call for help? Call your doctor now or seek immediate medical care if:    You have new or worse symptoms of infection, such as: Increased pain, swelling, warmth, or redness. Red streaks leading from the area. Pus draining from the area. A fever. Watch closely for changes in your health, and be sure to contact your doctor if:    You do not get better as expected. Where can you learn more? Go to http://www.woods.com/ and enter Q970 to learn more about \"Bursitis: Care Instructions. \"  Current as of: March 9, 2022               Content Version: 13.5  © 6328-1267 [employer], [employer]orporated. Care instructions adapted under license by [facility] ([facility]). If you have questions about a medical condition or this instruction, always ask your healthcare professional. [address] any warranty or liability for your use of this information.

## 2023-01-31 NOTE — PROGRESS NOTES
Patient ID:    Bernice Ayala  [id number]  33 y.o.  1955    Today: January 31, 2023      Chief Complaint: Left hip pain        Patient reports longstanding history of left hip pain. The pain is predominately localized to the lateral hip and is characterized as a general ache with occasional sharp pain. They rate the pain ranging from 3-8 on the pain scale occurring in a cyclical fashion with periods of acute exacerbation. Symptoms are exacerbated with attempting to sleep on the hip, attempting to ascend stairs, and sitting for long periods of time which results in lateral hip pain. Patient denies significant anterior groin pain and denies significant issues with attempting to put on socks and shoes or when attempting to end into or out or a vehicle. No numbness of tingling going down the extremity. Patient has attempted prior conservative treatment including Activity modification. Past Medical History:  Past Medical History:   Diagnosis Date    Anxiety     Arthritis     hands, knees    Chavez esophagus     Cataracts, bilateral     Depression     Deviated nasal septum 5/1/2014    Diabetes (HCC)     type l , last A1C was 7.6.  patient is a very brittle type l diabetic    Gastroparesis     GERD (gastroesophageal reflux disease)     managed with medication    Glaucoma     right eye    Osteoporosis     Prostate hypertrophy     managed with medication    Sleep apnea     wears cpap       Past Surgical History:  Past Surgical History:   Procedure Laterality Date    CARPAL TUNNEL RELEASE Bilateral     lt x 2 , rt x 1    CATARACT REMOVAL Bilateral     with iol    COLONOSCOPY N/A 3/16/2020    COLONOSCOPY/BMI 26 performed by Yoana Mcneal MD at [address] Left     lt hip    ORTHOPEDIC SURGERY      right femur    ORTHOPEDIC SURGERY Left     left shoulder repair with hardware    ORTHOPEDIC SURGERY      left wrist with hardware    ORTHOPEDIC SURGERY      left femur repair with hardware, with ball     ORTHOPEDIC SURGERY Left      left foot ankle withhardware    ORTHOPEDIC SURGERY      carpal tunel x3    OTHER SURGICAL HISTORY Bilateral     TFN    UPPER GASTROINTESTINAL ENDOSCOPY      x 3 with dialation        Medications:     Prior to Admission medications    Medication Sig Start Date End Date Taking? Authorizing Provider   aspirin 81 MG EC tablet Take 81 mg by mouth daily    Historical Provider, MD   calcium carb-cholecalciferol (CALCIUM 600+D3) 600-5 MG-MCG TABS tablet Take 1 tablet by mouth daily    Historical Provider, MD   losartan (COZAAR) 25 MG tablet Take 1 tablet by mouth daily 1/3/23   Jermaine Carpio MD   atorvastatin (LIPITOR) 20 MG tablet Take 20 mg by mouth    Ar Automatic Reconciliation   esomeprazole (NEXIUM) 40 MG delayed release capsule Take by mouth daily    Ar Automatic Reconciliation   FLUoxetine (PROZAC) 40 MG capsule Take 40 mg by mouth    Ar Automatic Reconciliation   insulin aspart (NOVOLOG) 100 UNIT/ML injection vial 3am 1.6 unis/hrMN 0.25 units am6am 0.5 units / hr11am 0.3 units /[id number] 0.35 units / [id number].  0.3 units /hc1321 .525 units / hr 8/29/18   Ar Automatic Reconciliation   montelukast (SINGULAIR) 10 MG tablet Take 10 mg by mouth daily    Ar Automatic Reconciliation   naproxen (NAPROSYN) 500 MG tablet Take 500 mg by mouth 3 times daily (with meals)    Ar Automatic Reconciliation   pentoxifylline (TRENTAL) 400 MG extended release tablet Take 400 mg by mouth 3 times daily (with meals)    Ar Automatic Reconciliation   timolol (BETIMOL) 0.5 % ophthalmic solution Apply 1 drop to eye    Ar Automatic Reconciliation       Family History:     Family History   Problem Relation Age of Onset    Cancer Father         prostate    Heart Disease Father     Kidney Disease Father     Heart Failure Mother        Social History:      Social History     Tobacco Use    Smoking status: Never    Smokeless tobacco: Never   Substance Use Topics    Alcohol use: No           Allergies:    No Known Allergies       ROS:  Patient Health Form has been filled out, reviewed, signed and included in the chart. ROS findings related to musculoskeletal problems were discussed with the patient. Patient advised to discuss non-orthopedic complaints with primary care physician. Objective:     Vitals:   Ht 6' 4.75\" (1.949 m)   Wt 200 lb (90.7 kg)   BMI 23.87 kg/m²     General: Awake and alert. AAOx3. The patient ambulates with an antalgic gait. Psych: Mood appropriate  HEENT: Normocephalic, atraumatic  Neck: Supple  CV/Pulm: Breathing even and unlabored  Abdomen: Nondistended  Circulation: Normal without obvious arterial or venous deficiency. Pulses palpable bilateral lower extremities. Lymphatic: No obvious lymphedema or lymphadenopathy noted. Skin: No obvious lacerations or rashes noted. Musculoskeletal: No obvious deformity or pain with active movement of the upper extremities. Neuro: No obvious deficiency or weakness noted of the upper or lower extremities    Hip Exam:   Exam of the hip reveals tenderness to palpation over the trochanter. Minimal groin pain with resisted leg raise and fadir testing. Motion in preserved in the involved hip. No evidence of arterial of venous insufficiency. DP/PT pulses appreciable. Able to plantar- and dorsi-flex the foot/ankle. Imaging:     XR HIP LT W OR WO PELVIS 2-3 VWS  Views Obtained: AP pelvis, lateral left hip  Indication: Left Hip Pain  Findings:  Xrays including A/P Pelvis and lateral of the hip(s) were reviewed which demonstrate presence of retained hip hardware without evidence of hip osteoarthritis. Joint space appears to be maintained and there is no evidence of subchondral sclerosis and/or osteophyte formation  Impression: Normal appearing left hip    Arlen Powell MD        Assessment:   Hip Pain    Plan:  Differential diagnosis and treatment options have been discussed with the patient.  Risks, benefits and alternatives of each were discussed and patient questions answered. At this point the patient would like to proceed with Formal physical therapy for IT band stretching    Treatment Rendered/ Procedure Note:    The patient will be referred for formal physical therapy for IT band stretching techniques    RECOMMENDATIONS:    We discussed the importance of IT band stretching. Stretching needs to be done 2-3 times daily for a period of 6 weeks followed by maintenance stretching to be done 1-2 times daily for the foreseeable future. A handout discussing trochanteric bursitis along with stretching maneuvers is provided to the patient today. If the patient is still having significant issues/pain after 4-6 weeks can call for followup. If the patient fails to see any relief from treatment we may opt to evaluate the patient's hip and/or lumbar spine more closely. Otherwise the patient call followup as needed.        Signed By: Aramis Lindquist MD  January 31, 2023

## 2023-01-31 NOTE — PROGRESS NOTES
[address] [place] [address] Court, [address] [facility]  [phone]    Patient Name:  Felipe Tejada  YOB: 1955      Office Visit 2/10/2023    CHIEF COMPLAINT:    Chief Complaint   Patient presents with    Sleep Apnea         HISTORY OF PRESENT ILLNESS:  Patient is a 74 yo  seen today for follow up of sleep apnea. Sleep study in 2013 revealed AHI of 6.0 with lowest desaturation of 93%. He is prescribed cpap therapy with a humidifier set at 11cm with a full face mask. Most recent download reveals AHI on PAP therapy is 3.0, leak is 15.1 and the hourly usage is 8 hours 29 minutes nightly. The overall use is 195 hours with days greater than four hours at 23/24. The patient is compliant with the Pap therapy and is feeling better as a result. Patient states he is feeling rested morning, has some occasional daytime fatigue and may occasionally nap. He states he will nap because he is able to not because of fatigue. Current Eminence score is 2/24. He is falling asleep easily and denies any frequent nocturnal awakenings. Since last visit he denies any changes to medical history or weight changes. He has had some recent falls but is supposed to see physical therapy for this. Past Medical History:   Diagnosis Date    Anxiety     Arthritis     hands, knees    Chavez esophagus     Cataracts, bilateral     Depression     Deviated nasal septum 5/1/2014    Diabetes ([facility])     type l , last A1C was 7.6.  patient is a very brittle type l diabetic    Gastroparesis     GERD (gastroesophageal reflux disease)     managed with medication    Glaucoma     right eye    Osteoporosis     Prostate hypertrophy     managed with medication    Sleep apnea     wears cpap         Patient Active Problem List   Diagnosis    Incontinence    NAINA on CPAP    Gastroesophageal reflux disease without esophagitis    Hip fracture (HCC)    BPH with obstruction/lower urinary tract symptoms Postural hypotension    Controlled type 2 diabetes mellitus, with long-term current use of insulin (HCC)    Essential hypertension    Dyslipidemia    Urinary retention    Deviated nasal septum    Coronary artery disease involving native coronary artery of native heart without angina pectoris    Osteoporosis    Nocturia    Family history of prostate cancer in father    Chronic fatigue          Past Surgical History:   Procedure Laterality Date    CARPAL TUNNEL RELEASE Bilateral     lt x 2 , rt x 1    CATARACT REMOVAL Bilateral     with iol    COLONOSCOPY N/A 3/16/2020    COLONOSCOPY/BMI 26 performed by Sampson Jj MD at [facility] [address] Left     lt hip    ORTHOPEDIC SURGERY      right femur    ORTHOPEDIC SURGERY Left     left shoulder repair with hardware    ORTHOPEDIC SURGERY      left wrist with hardware    ORTHOPEDIC SURGERY      left femur repair with hardware, with ball     ORTHOPEDIC SURGERY Left      left foot ankle withhardware    ORTHOPEDIC SURGERY      carpal tunel x3    OTHER SURGICAL HISTORY Bilateral     TFN    UPPER GASTROINTESTINAL ENDOSCOPY      x 3 with dialation           Social History     Socioeconomic History    Marital status: Single     Spouse name: Not on file    Number of children: Not on file    Years of education: Not on file    Highest education level: Not on file   Occupational History    Not on file   Tobacco Use    Smoking status: Never    Smokeless tobacco: Never   Vaping Use    Vaping Use: Never used   Substance and Sexual Activity    Alcohol use: No    Drug use: No    Sexual activity: Not on file   Other Topics Concern    Not on file   Social History Narrative    Not on file     Social Determinants of Health     Financial Resource Strain: Not on file   Food Insecurity: Not on file   Transportation Needs: Not on file   Physical Activity: Not on file   Stress: Not on file   Social Connections: Not on file   Intimate Partner Violence: Not on file   Housing Stability: Not on file         Family History   Problem Relation Age of Onset    Cancer Father         prostate    Heart Disease Father     Kidney Disease Father     Heart Failure Mother          No Known Allergies      Current Outpatient Medications   Medication Sig    aspirin 81 MG EC tablet Take 81 mg by mouth daily    calcium carb-cholecalciferol 600-5 MG-MCG TABS tablet Take 1 tablet by mouth daily    losartan (COZAAR) 25 MG tablet Take 1 tablet by mouth daily    atorvastatin (LIPITOR) 20 MG tablet Take 20 mg by mouth    esomeprazole (NEXIUM) 40 MG delayed release capsule Take by mouth daily    FLUoxetine (PROZAC) 40 MG capsule Take 40 mg by mouth    insulin aspart (NOVOLOG) 100 UNIT/ML injection vial 3am 1.6 unis/hrMN 0.25 units am6am 0.5 units / hr11am 0.3 units /[id number] 0.35 units / [id number]. 0.3 units /zl2991 .525 units / hr    montelukast (SINGULAIR) 10 MG tablet Take 10 mg by mouth daily    naproxen (NAPROSYN) 500 MG tablet Take 500 mg by mouth 3 times daily (with meals)    pentoxifylline (TRENTAL) 400 MG extended release tablet Take 400 mg by mouth 3 times daily (with meals)    timolol (BETIMOL) 0.5 % ophthalmic solution Apply 1 drop to eye    diclofenac sodium (VOLTAREN) 1 % GEL Apply 4 g topically 4 times daily (Patient not taking: Reported on 2/10/2023)     No current facility-administered medications for this visit. REVIEW OF SYSTEMS:   CONSTITUTIONAL:   There is no history of fever, chills, night sweats, weight loss, weight gain, persistent fatigue, or lethargy/hypersomnolence. CARDIAC:   No chest pain, pressure, discomfort, palpitations, orthopnea, murmurs, or edema. GI:   No dysphagia, heartburn reflux, nausea/vomiting, diarrhea, abdominal pain, or bleeding. NEURO:   There is no history of AMS, persistent headache, decreased level of consciousness, seizures, or motor or sensory deficits.       PHYSICAL EXAM:    Vitals:    02/10/23 1312   BP: 130/78   Pulse: 71   Resp: 18   Temp: 97.6 °F (36.4 °C)   TempSrc: Skin  Comment: wrist   SpO2: 100%   Weight: 199 lb 3.2 oz (90.4 kg)   Height: 6' 4\" (1.93 m)        Body mass index is 24.25 kg/m². GENERAL APPEARANCE:   The patient is normal weight and in no respiratory distress. HEENT:   PERRL. Conjunctivae unremarkable. Nasal mucosa is without epistaxis, exudate, or polyps. Gums and dentition are unremarkable. There is oropharyngeal narrowing. NECK/LYMPHATIC:   Symmetrical with no elevation of jugular venous pulsation. Trachea midline. No thyroid enlargement. No cervical adenopathy. LUNGS:   Normal respiratory effort with symmetrical lung expansion. Breath sounds clear. HEART:   There is a regular rate and rhythm. No murmur, rub, or gallop. There is no edema in the lower extremities. ABDOMEN:   Soft and non-tender. No hepatosplenomegaly. Bowel sounds are normal.     NEURO:   The patient is alert and oriented to person, place, and time. Memory appears intact and mood is normal.  No gross sensorimotor deficits are present. ASSESSMENT:  (Medical Decision Making)      Diagnosis Orders   1. NAINA on CPAP  DME - [facility]   Patient is seen for follow-up of Medicare compliance. AHI is well controlled on Pap therapy. Patient is tolerating and benefiting from CPAP. We will continue current settings and renew supplies today. PLAN:  Continue CPAP with nightly compliance. Renew supplies today. Follow-up in 1 year or sooner if needed.     Orders Placed This Encounter   Procedures    DME - [address]  [address] [facility] [address]  Dept: [phone]     Patient Name: Michele Sims  : 1955  Gender: male  Address: [address] [place] [id number]  Phone: [phone]     Primary Insurance: Payor: MEDICARE / Plan: MEDICARE PART A AND B / Product Type: *No Product type* /   Subscriber ID: [id number] - (Medicare)      AMB Supply Order  Order Details     DME Location:resource   Order Date: 2/10/2023   The encounter diagnosis was NAINA on CPAP. (  X   )Supplies Needed       autoCPAP Machine   (     ) CPAP Unit  (     ) Auto CPAP Unit  (     ) BiLevel Unit  (     ) Auto BiLevel Unit  (     ) ASV   (     ) Bilevel ST      Length of need: 12 months    Pressure:  11 cmH20  EPR:      Starting Ramp Pressure:   cm H20  Ramp Time: min      Patient had a diagnostic Apnea Hypopnea Index (AHI) of :    *SUPPLIES* Replace all as needed, or per coverage guidelines     Masks Type:  ( x   ) -Full Face Mask (1 per 3 mon)  (  x  ) -Full Mask (1 per month) Interface/Cushion      (  ) -Nasal Mask (1 per 3 mon)  (  ) - Nasal Mask (1 per month) Interface/Cushion  (     ) -Pillow (2 per mon)  (     ) -Cueiizpoj (1 per 6 mon)            Other Supplies:    (   X  )-Uqffegie (1 per 6 mon)  (   X  )-Zdxtgz Tubing (1 per 3 mon)  (   X  )- Disposable Filter (2 per mon)  (   x  )-Gqzrks Humidifier (1 per year)     ( x    )-Tloabjhgv (sometimes used with Full Face Mask) (1 per 6 mos)  (    )-Tubing-without heat (1 per 3 mos)  (     )-Non-Disposable Filter (1 per 6 mos)  (  x   )-Water Chamber (1 per 6 mos)  (     )-Humidifier non-heated (1 per 5 yrs)      Signed Date: 2/10/2023  Electronically Signed By: ELIAS Corrales CNP  Electronically Dated:  2/10/2023       Collaborating Physician: Dr. Brennan Conner    Over 50% of today's office visit was spent in face to face time reviewing test results, prognosis, importance of compliance, education about disease process, benefits of medications, instructions for management of acute flare-ups, and follow up plans. Total face to face time spent with patient was 20 minutes.         ELIAS Corrales CNP  Electronically signed

## 2023-02-10 ENCOUNTER — OFFICE VISIT (OUTPATIENT)
Dept: SLEEP MEDICINE | Age: 68
End: 2023-02-10
Payer: MEDICARE

## 2023-02-10 VITALS
WEIGHT: 199.2 LBS | SYSTOLIC BLOOD PRESSURE: 130 MMHG | RESPIRATION RATE: 18 BRPM | HEIGHT: 76 IN | TEMPERATURE: 97.6 F | OXYGEN SATURATION: 100 % | DIASTOLIC BLOOD PRESSURE: 78 MMHG | HEART RATE: 71 BPM | BODY MASS INDEX: 24.26 KG/M2

## 2023-02-10 DIAGNOSIS — Z99.89 OSA ON CPAP: Primary | ICD-10-CM

## 2023-02-10 DIAGNOSIS — G47.33 OSA ON CPAP: Primary | ICD-10-CM

## 2023-02-10 PROCEDURE — 99213 OFFICE O/P EST LOW 20 MIN: CPT | Performed by: STUDENT IN AN ORGANIZED HEALTH CARE EDUCATION/TRAINING PROGRAM

## 2023-02-10 PROCEDURE — 3075F SYST BP GE 130 - 139MM HG: CPT | Performed by: STUDENT IN AN ORGANIZED HEALTH CARE EDUCATION/TRAINING PROGRAM

## 2023-02-10 PROCEDURE — 1036F TOBACCO NON-USER: CPT | Performed by: STUDENT IN AN ORGANIZED HEALTH CARE EDUCATION/TRAINING PROGRAM

## 2023-02-10 PROCEDURE — 3078F DIAST BP <80 MM HG: CPT | Performed by: STUDENT IN AN ORGANIZED HEALTH CARE EDUCATION/TRAINING PROGRAM

## 2023-02-10 PROCEDURE — G8420 CALC BMI NORM PARAMETERS: HCPCS | Performed by: STUDENT IN AN ORGANIZED HEALTH CARE EDUCATION/TRAINING PROGRAM

## 2023-02-10 PROCEDURE — 1123F ACP DISCUSS/DSCN MKR DOCD: CPT | Performed by: STUDENT IN AN ORGANIZED HEALTH CARE EDUCATION/TRAINING PROGRAM

## 2023-02-10 PROCEDURE — G8427 DOCREV CUR MEDS BY ELIG CLIN: HCPCS | Performed by: STUDENT IN AN ORGANIZED HEALTH CARE EDUCATION/TRAINING PROGRAM

## 2023-02-10 PROCEDURE — G8484 FLU IMMUNIZE NO ADMIN: HCPCS | Performed by: STUDENT IN AN ORGANIZED HEALTH CARE EDUCATION/TRAINING PROGRAM

## 2023-02-10 PROCEDURE — 3017F COLORECTAL CA SCREEN DOC REV: CPT | Performed by: STUDENT IN AN ORGANIZED HEALTH CARE EDUCATION/TRAINING PROGRAM

## 2023-02-10 ASSESSMENT — SLEEP AND FATIGUE QUESTIONNAIRES
HOW LIKELY ARE YOU TO NOD OFF OR FALL ASLEEP WHILE SITTING INACTIVE IN A PUBLIC PLACE: 0
HOW LIKELY ARE YOU TO NOD OFF OR FALL ASLEEP WHILE WATCHING TV: 0
HOW LIKELY ARE YOU TO NOD OFF OR FALL ASLEEP WHILE SITTING AND READING: 0
HOW LIKELY ARE YOU TO NOD OFF OR FALL ASLEEP WHILE LYING DOWN TO REST IN THE AFTERNOON WHEN CIRCUMSTANCES PERMIT: 2
HOW LIKELY ARE YOU TO NOD OFF OR FALL ASLEEP WHEN YOU ARE A PASSENGER IN A CAR FOR AN HOUR WITHOUT A BREAK: 0
ESS TOTAL SCORE: 2
HOW LIKELY ARE YOU TO NOD OFF OR FALL ASLEEP IN A CAR, WHILE STOPPED FOR A FEW MINUTES IN TRAFFIC: 0
HOW LIKELY ARE YOU TO NOD OFF OR FALL ASLEEP WHILE SITTING AND TALKING TO SOMEONE: 0
HOW LIKELY ARE YOU TO NOD OFF OR FALL ASLEEP WHILE SITTING QUIETLY AFTER LUNCH WITHOUT ALCOHOL: 0

## 2023-07-04 ASSESSMENT — ENCOUNTER SYMPTOMS
SORE THROAT: 0
ABDOMINAL PAIN: 0
DIARRHEA: 0
ABDOMINAL DISTENTION: 0
PHOTOPHOBIA: 0
CONSTIPATION: 0
COUGH: 0
SHORTNESS OF BREATH: 0

## 2023-07-04 NOTE — PROGRESS NOTES
[facility] CARDIOLOGY  [address] [facility], [facility] [place] [facility]  PHONE: [phone]        NAME:  Ashley Rooney  : 1955  MRN: [id number]     PCP:  Edgardo Amin MD      SUBJECTIVE:   Ashley Rooney is a 75 y.o. male seen for a follow up visit regarding the following:     Chief Complaint   Patient presents with    Hypertension    Coronary Artery Disease       HPI:     He presented recently to establish new cardiac care, previously cared for by Dr. Sumit Bean with mild CAD in 2013 by cath, and risk factors as noted below. Doing well since last visit without interval angina, CHF, palpitations, edema, presyncope or syncope but blood pressures been intermittently marginal on 12.5 mg losartan twice daily with dizziness but no manuela loss of consciousness from fall/injury. Working at least a gallon of water a day but working out in the yard. He worked 12 hours yesterday with no exertional symptoms or limitations other than postural dizziness. Exam and ECG benign. Echo  and Summer 2020 basically normal.  Carotid duplex Summer 2020 with mild intimal thickening bilaterally at most.  Having recurrent exertional fatigue but staying very active in his yard. No associated chest discomfort but a longstanding diabetic with distal peripheral neuropathy. Having exertional dyspnea but appropriate for his level of exertion. No heart failure or arrhythmia symptoms. Nuclear stress test 10/4/2022:    Nuclear Findings: LV perfusion is probably normal.There is a fixed inferior defect with normal wall motion consistent with diaphragmatic attenuation artifact. There is no reversible ischemia noted. Nuclear Findings: There is no evidence of transient ischemic dilation (TID). Ratio of 0.87    Nuclear Findings: Normal left ventricular systolic function post-stress. ECG: Resting ECG demonstrates normal sinus rhythm. Stress ECG without change.     Stress Test: A

## 2023-07-07 ENCOUNTER — OFFICE VISIT (OUTPATIENT)
Age: 68
End: 2023-07-07
Payer: MEDICARE

## 2023-07-07 VITALS
HEIGHT: 76 IN | DIASTOLIC BLOOD PRESSURE: 62 MMHG | SYSTOLIC BLOOD PRESSURE: 118 MMHG | BODY MASS INDEX: 24.11 KG/M2 | WEIGHT: 198 LBS

## 2023-07-07 DIAGNOSIS — G47.33 OSA ON CPAP: ICD-10-CM

## 2023-07-07 DIAGNOSIS — I95.1 POSTURAL HYPOTENSION: ICD-10-CM

## 2023-07-07 DIAGNOSIS — K21.9 GASTROESOPHAGEAL REFLUX DISEASE WITHOUT ESOPHAGITIS: ICD-10-CM

## 2023-07-07 DIAGNOSIS — I10 ESSENTIAL HYPERTENSION: ICD-10-CM

## 2023-07-07 DIAGNOSIS — I25.10 CORONARY ARTERY DISEASE INVOLVING NATIVE CORONARY ARTERY OF NATIVE HEART WITHOUT ANGINA PECTORIS: Primary | ICD-10-CM

## 2023-07-07 DIAGNOSIS — Z99.89 OSA ON CPAP: ICD-10-CM

## 2023-07-07 DIAGNOSIS — E78.5 DYSLIPIDEMIA: ICD-10-CM

## 2023-07-07 PROCEDURE — 99214 OFFICE O/P EST MOD 30 MIN: CPT | Performed by: INTERNAL MEDICINE

## 2023-07-07 PROCEDURE — 3017F COLORECTAL CA SCREEN DOC REV: CPT | Performed by: INTERNAL MEDICINE

## 2023-07-07 PROCEDURE — 1036F TOBACCO NON-USER: CPT | Performed by: INTERNAL MEDICINE

## 2023-07-07 PROCEDURE — 3074F SYST BP LT 130 MM HG: CPT | Performed by: INTERNAL MEDICINE

## 2023-07-07 PROCEDURE — 3078F DIAST BP <80 MM HG: CPT | Performed by: INTERNAL MEDICINE

## 2023-07-07 PROCEDURE — G8420 CALC BMI NORM PARAMETERS: HCPCS | Performed by: INTERNAL MEDICINE

## 2023-07-07 PROCEDURE — 1123F ACP DISCUSS/DSCN MKR DOCD: CPT | Performed by: INTERNAL MEDICINE

## 2023-07-07 PROCEDURE — G8427 DOCREV CUR MEDS BY ELIG CLIN: HCPCS | Performed by: INTERNAL MEDICINE

## 2023-09-12 ENCOUNTER — APPOINTMENT (RX ONLY)
Dept: URBAN - METROPOLITAN AREA CLINIC 329 | Facility: CLINIC | Age: 68
Setting detail: DERMATOLOGY
End: 2023-09-12

## 2023-09-12 DIAGNOSIS — D18.0 HEMANGIOMA: ICD-10-CM

## 2023-09-12 DIAGNOSIS — L85.3 XEROSIS CUTIS: ICD-10-CM

## 2023-09-12 DIAGNOSIS — F42.4 EXCORIATION (SKIN-PICKING) DISORDER: ICD-10-CM | Status: INADEQUATELY CONTROLLED

## 2023-09-12 DIAGNOSIS — L82.1 OTHER SEBORRHEIC KERATOSIS: ICD-10-CM

## 2023-09-12 DIAGNOSIS — L23.1 ALLERGIC CONTACT DERMATITIS DUE TO ADHESIVES: ICD-10-CM

## 2023-09-12 DIAGNOSIS — D22 MELANOCYTIC NEVI: ICD-10-CM

## 2023-09-12 DIAGNOSIS — L81.4 OTHER MELANIN HYPERPIGMENTATION: ICD-10-CM

## 2023-09-12 PROBLEM — D18.01 HEMANGIOMA OF SKIN AND SUBCUTANEOUS TISSUE: Status: ACTIVE | Noted: 2023-09-12

## 2023-09-12 PROBLEM — D22.5 MELANOCYTIC NEVI OF TRUNK: Status: ACTIVE | Noted: 2023-09-12

## 2023-09-12 PROCEDURE — 99213 OFFICE O/P EST LOW 20 MIN: CPT

## 2023-09-12 PROCEDURE — ? COUNSELING

## 2023-09-12 PROCEDURE — ? SUNSCREEN RECOMMENDATIONS

## 2023-09-12 PROCEDURE — ? RECOMMENDATIONS

## 2023-09-12 PROCEDURE — ? ADDITIONAL NOTES

## 2023-09-12 ASSESSMENT — LOCATION DETAILED DESCRIPTION DERM
LOCATION DETAILED: EPIGASTRIC SKIN
LOCATION DETAILED: RIGHT MEDIAL MALAR CHEEK
LOCATION DETAILED: INFERIOR THORACIC SPINE
LOCATION DETAILED: RIGHT MEDIAL BREAST 1-2:00 REGION
LOCATION DETAILED: SUPERIOR THORACIC SPINE
LOCATION DETAILED: PERIUMBILICAL SKIN
LOCATION DETAILED: RIGHT SUPERIOR MEDIAL UPPER BACK
LOCATION DETAILED: RIGHT SUPERIOR UPPER BACK

## 2023-09-12 ASSESSMENT — LOCATION SIMPLE DESCRIPTION DERM
LOCATION SIMPLE: RIGHT CHEEK
LOCATION SIMPLE: ABDOMEN
LOCATION SIMPLE: UPPER BACK
LOCATION SIMPLE: RIGHT BREAST
LOCATION SIMPLE: RIGHT UPPER BACK

## 2023-09-12 ASSESSMENT — LOCATION ZONE DERM
LOCATION ZONE: FACE
LOCATION ZONE: TRUNK

## 2023-09-12 NOTE — HPI: SKIN LESIONS
How Severe Is Your Skin Lesion?: moderate
Is This A New Presentation, Or A Follow-Up?: Skin Lesions
Additional History: Pt had a lesion removed from his back.

## 2023-09-12 NOTE — PROCEDURE: ADDITIONAL NOTES
Detail Level: Simple
Render Risk Assessment In Note?: no
Additional Notes: Pt will call if lesion does not heal.

## 2023-09-12 NOTE — PROCEDURE: RECOMMENDATIONS
Render Risk Assessment In Note?: no
Recommendations (Free Text): Gave sample for CeraVe lotion. Recommended cortisone cream.
Detail Level: Zone

## 2023-12-27 ENCOUNTER — PROCEDURE VISIT (OUTPATIENT)
Dept: NEUROLOGY | Age: 68
End: 2023-12-27
Payer: MEDICARE

## 2023-12-27 VITALS — WEIGHT: 189 LBS | BODY MASS INDEX: 23.02 KG/M2 | HEIGHT: 76 IN | OXYGEN SATURATION: 99 % | HEART RATE: 92 BPM

## 2023-12-27 DIAGNOSIS — R20.2 NUMBNESS AND TINGLING IN BOTH HANDS: Primary | ICD-10-CM

## 2023-12-27 DIAGNOSIS — R20.0 NUMBNESS AND TINGLING IN BOTH HANDS: Primary | ICD-10-CM

## 2023-12-27 PROCEDURE — 95913 NRV CNDJ TEST 13/> STUDIES: CPT | Performed by: PSYCHIATRY & NEUROLOGY

## 2023-12-27 PROCEDURE — 95886 MUSC TEST DONE W/N TEST COMP: CPT | Performed by: PSYCHIATRY & NEUROLOGY

## 2023-12-27 NOTE — PROGRESS NOTES
SCV :  Sensory conduction velocity;  MCV: motor conduction velocity; NOTE[de-identified] muscles are abbreviated latin initials. Nicolet raw datafile[de-identified]   * filed at Procedure or [employer].  *

## 2024-01-22 NOTE — PROGRESS NOTES
Heart sounds: No murmur heard.     No friction rub. No gallop.   Pulmonary:      Effort: Pulmonary effort is normal.      Breath sounds: Normal breath sounds. No wheezing or rhonchi.   Abdominal:      General: Abdomen is flat. Bowel sounds are normal. There is no distension.      Palpations: Abdomen is soft.      Tenderness: There is no abdominal tenderness.   Musculoskeletal:         General: No swelling. Normal range of motion.      Cervical back: Normal range of motion and neck supple. No tenderness.   Skin:     General: Skin is warm and dry.   Neurological:      General: No focal deficit present.      Mental Status: He is alert and oriented to person, place, and time. Mental status is at baseline.   Psychiatric:         Mood and Affect: Mood normal.         Behavior: Behavior normal.          Medical problems and test results were reviewed with the patient today.       No results found for: \"CHOL\"  No results found for: \"TRIG\"  No results found for: \"HDL\"  No results found for: \"LDLCHOLESTEROL\", \"LDLCALC\"  No results found for: \"LABVLDL\", \"VLDL\"  No results found for: \"CHOLHDLRATIO\"     No results found for: \"NA\", \"K\", \"CL\", \"CO2\", \"BUN\", \"CREATININE\", \"GLUCOSE\", \"CALCIUM\"      No results for input(s): \"WBC\", \"HGB\", \"HCT\", \"MCV\", \"PLT\" in the last 720 hours.     No results found for: \"LABA1C\"  No results found for: \"EAG\"     No results found for: \"BNP\"     No results found for: \"TSHFT4\", \"TSH\"     No results found for any visits on 01/24/24.     ASSESSMENT and PLAN    Diagnoses and all orders for this visit:      1. Coronary artery disease involving native coronary artery of native heart without angina pectoris - mild disease in 2013. Asymptomatic and active.  No further evaluation needed at present.        2. NAINA on CPAP- likes CPAP and tolerating it well.  Echo sometime soon      3. Controlled type 2 diabetes mellitus without complication, with long-term current use of insulin (HCC)- per Dr. Giraldo, see

## 2024-01-24 ENCOUNTER — OFFICE VISIT (OUTPATIENT)
Age: 69
End: 2024-01-24

## 2024-01-24 VITALS
SYSTOLIC BLOOD PRESSURE: 120 MMHG | WEIGHT: 192 LBS | DIASTOLIC BLOOD PRESSURE: 58 MMHG | HEIGHT: 76 IN | BODY MASS INDEX: 23.38 KG/M2 | HEART RATE: 80 BPM

## 2024-01-24 DIAGNOSIS — R42 DIZZINESS: ICD-10-CM

## 2024-01-24 DIAGNOSIS — I10 ESSENTIAL HYPERTENSION: ICD-10-CM

## 2024-01-24 DIAGNOSIS — E78.5 DYSLIPIDEMIA: ICD-10-CM

## 2024-01-24 DIAGNOSIS — R55 POSTURAL DIZZINESS WITH NEAR SYNCOPE: ICD-10-CM

## 2024-01-24 DIAGNOSIS — G47.33 OSA ON CPAP: ICD-10-CM

## 2024-01-24 DIAGNOSIS — R42 POSTURAL DIZZINESS WITH NEAR SYNCOPE: ICD-10-CM

## 2024-01-24 DIAGNOSIS — I25.10 CORONARY ARTERY DISEASE INVOLVING NATIVE CORONARY ARTERY OF NATIVE HEART WITHOUT ANGINA PECTORIS: Primary | ICD-10-CM

## 2024-01-24 LAB
CHOLEST SERPL-MCNC: 127 MG/DL
HDLC SERPL-MCNC: 38 MG/DL (ref 40–60)
HDLC SERPL: 3.3
LDLC SERPL CALC-MCNC: 69.2 MG/DL
TRIGL SERPL-MCNC: 99 MG/DL (ref 35–150)
VLDLC SERPL CALC-MCNC: 19.8 MG/DL (ref 6–23)

## 2024-01-24 RX ORDER — TAFLUPROST OPTHALMIC 0 MG/.3ML
SOLUTION/ DROPS OPHTHALMIC
COMMUNITY
Start: 2024-01-02

## 2024-01-25 ENCOUNTER — TELEPHONE (OUTPATIENT)
Age: 69
End: 2024-01-25

## 2024-01-25 NOTE — TELEPHONE ENCOUNTER
----- Message from Waldemar Garcia MD sent at 1/25/2024 12:30 PM EST -----  EXCELLENT, KEEP UP THE GOOD WORK AND CONTINUE CURRENT MEDS

## 2024-03-01 ENCOUNTER — TELEPHONE (OUTPATIENT)
Age: 69
End: 2024-03-01

## 2024-03-01 NOTE — TELEPHONE ENCOUNTER
Unable to leave message for call back. Patient should return monitor in the box with return pre-paid label.

## 2024-03-01 NOTE — TELEPHONE ENCOUNTER
Patient brother wants to know where to send  monitor back to . Please call Dre (brother) [phone]

## 2024-03-06 NOTE — TELEPHONE ENCOUNTER
Pts brother came in with box from previously worn [employer] monitor. Confirmed serial number on monitor and box and sent in office mail. Contacted Janeth Pillai to make her aware.

## 2024-03-06 NOTE — TELEPHONE ENCOUNTER
Spoke to patients brother and he states he is unsure if the patient still has the box to return the monitor. Patients brother is going to call back.

## 2024-03-13 ENCOUNTER — TELEPHONE (OUTPATIENT)
Age: 69
End: 2024-03-13

## 2024-03-13 NOTE — TELEPHONE ENCOUNTER
Zio Rep reports pt had V Fib on 1/31/24 10:24a, 608.3 seconds.  Pg 14, strip 7 of Zio report.   Pt missed 3/1/24 FU.  Lvm for pt to return call to [facility] appt.   Lvm for EC. cgh

## 2024-03-13 NOTE — TELEPHONE ENCOUNTER
Lvm for pt to return call.   Notes in Epic/[facility] 1/31/24 Cardiac arrest, V fib. Resuscitated.   3/09/24 dc'd to SNF  Subsequent admission 3/11/24-3/13/24 j-tube complications and hyperglycemia. cgh

## 2024-03-13 NOTE — TELEPHONE ENCOUNTER
I looked at the tracings.  The patient would have  if he had this arrhythmia or at least lost consciousness.  I think this is probably artifact but please call him to see if he had any symptoms at 10:30 in the morning when he had this prolonged arrhythmia.  If he did, especially if he became dizzy or lost consciousness I think we need to admit him to the hospital.  If he had no symptoms, get an echo and repeat the nuclear stress test, expedite both and have him see me in the office.  Make sure he stays well-hydrated as we discussed previously

## 2024-03-13 NOTE — TELEPHONE ENCOUNTER
Tentative appt 3/19/24 3:15p Dr. Jose MA. LVM for pt to return call.  LVM for pt's brother to return call.   Noted pt d/cd to [facility]. LVM on appointment line asking for return call re pt's desire to FU at [facility] or [facility]. cgh

## 2024-03-13 NOTE — TELEPHONE ENCOUNTER
Events from DKA/acidosis mediated arrest noted.  Tracings are most likely consistent with VT degenerating into ventricular fibrillation during the arrest.  Thankfully he was resuscitated.  He definitely needs an ischemic workup.  If he is home/when he goes home from rehab make sure he sees me soon in the office to discuss cardiac catheterization.  Okay to overbook me anytime.

## 2024-03-14 NOTE — TELEPHONE ENCOUNTER
Left VM for Transport office, secured line with [facility] stating pt has UCD f/u 3/19/243:15p  Dr. Garcia. States [facility] was seen at [facility]. Not sure which Provider pt wants to f/u with but he does have UCD f/u scheduled.  Return call, prn.  UCD phone number given.  cgh

## 2024-03-19 ENCOUNTER — TELEPHONE (OUTPATIENT)
Age: 69
End: 2024-03-19

## 2024-03-19 NOTE — TELEPHONE ENCOUNTER
Spoke to Lelo and informed her that per  there really is no alternative other than to discontinue the medication. Lelo is going to contact the primary and confirm with them the discontinuation of the medication as the primary is the ordering provider.

## 2024-03-19 NOTE — TELEPHONE ENCOUNTER
Please call Lelo at [facility] ,she need to speak with someone about pt med Pentoxifylline  Please call her at [phone]

## (undated) DEVICE — (D)PREP SKN CHLRAPRP APPL 26ML -- CONVERT TO ITEM 371833

## (undated) DEVICE — BUTTON SWITCH PENCIL BLADE ELECTRODE, HOLSTER: Brand: EDGE

## (undated) DEVICE — 2000CC GUARDIAN II: Brand: GUARDIAN

## (undated) DEVICE — ZIMMER® STERILE DISPOSABLE TOURNIQUET CUFF WITH PLC, DUAL PORT, SINGLE BLADDER, 18 IN. (46 CM)

## (undated) DEVICE — REM POLYHESIVE ADULT PATIENT RETURN ELECTRODE: Brand: VALLEYLAB

## (undated) DEVICE — OCCLUSIVE GAUZE STRIP,3% BISMUTH TRIBROMOPHENATE IN PETROLATUM BLEND: Brand: XEROFORM

## (undated) DEVICE — SUT ETHLN 4-0 18IN PS2 BLK --

## (undated) DEVICE — FORCEPS BX L240CM JAW DIA2.8MM L CAP W/ NDL MIC MESH TOOTH

## (undated) DEVICE — CANNULA NSL ORAL AD FOR CAPNOFLEX CO2 O2 AIRLFE

## (undated) DEVICE — DRAPE XR C ARM 41X74IN LF --

## (undated) DEVICE — K WIRE FIX L150MM DIA1.25MM S STL TRCR PNT
Type: IMPLANTABLE DEVICE | Site: WRIST | Status: NON-FUNCTIONAL
Removed: 2018-02-21

## (undated) DEVICE — STERILE HOOK LOCK LATEX FREE ELASTIC BANDAGE 3INX5YD: Brand: HOOK LOCK™

## (undated) DEVICE — MASTISOL ADHESIVE LIQ 2/3ML

## (undated) DEVICE — SLING ARM AD ULT

## (undated) DEVICE — PADDING CAST COHESIVE 4 YDX3 IN HND TEARABLE COTTON SPEC 100

## (undated) DEVICE — ESOPHAGEAL BALLOON DILATATION CATHETER: Brand: CRE FIXED WIRE

## (undated) DEVICE — CONTAINER PREFIL FRMLN 40ML --

## (undated) DEVICE — SCREW BNE L16MM DIA2.7MM CORT S STL ST LOK FULL THRD T8
Type: IMPLANTABLE DEVICE | Site: WRIST | Status: NON-FUNCTIONAL
Removed: 2018-02-21

## (undated) DEVICE — SOLUTION IV 1000ML 0.9% SOD CHL

## (undated) DEVICE — KENDALL RADIOLUCENT FOAM MONITORING ELECTRODE RECTANGULAR SHAPE: Brand: KENDALL

## (undated) DEVICE — 2.0MM DRILL BIT/QC/100MM

## (undated) DEVICE — SUTURE PDS II SZ 3-0 L27IN ABSRB VLT CT-2 L26MM 1/2 CIR Z332H

## (undated) DEVICE — SURGICAL PROCEDURE PACK BASIC ST FRANCIS

## (undated) DEVICE — SYR 3ML LL TIP 1/10ML GRAD --

## (undated) DEVICE — BLOCK BITE AD 60FR W/ VELC STRP ADDRESSES MOST PT AND

## (undated) DEVICE — SUTURE MCRYL SZ 4-0 L27IN ABSRB UD L19MM PS-2 1/2 CIR PRIM Y426H

## (undated) DEVICE — 1.8MM DRILL BIT WITH DEPTH MARK/QC/110MM

## (undated) DEVICE — SYR 5ML 1/5 GRAD LL NSAF LF --

## (undated) DEVICE — CONNECTOR TBNG OD5-7MM O2 END DISP

## (undated) DEVICE — (D)STRIP SKN CLSR 0.5X4IN WHT --

## (undated) DEVICE — NDL PRT INJ NSAF BLNT 18GX1.5 --

## (undated) DEVICE — SPLINT THMB W3XL12IN FBRGLS PD PRECUT LTWT DURABLE FAST SET